# Patient Record
Sex: FEMALE | Race: WHITE | Employment: UNEMPLOYED | URBAN - METROPOLITAN AREA
[De-identification: names, ages, dates, MRNs, and addresses within clinical notes are randomized per-mention and may not be internally consistent; named-entity substitution may affect disease eponyms.]

---

## 2018-01-23 ENCOUNTER — HOSPITAL ENCOUNTER (OUTPATIENT)
Dept: MRI IMAGING | Age: 33
Discharge: HOME | End: 2018-01-23

## 2018-01-23 DIAGNOSIS — M54.16 LUMBAR RADICULOPATHY: ICD-10-CM

## 2018-09-25 LAB
ESTRADIOL LEVEL: 211 PG/ML
FOLLICLE STIMULATING HORMONE: 2.6 MIU/ML
TSH SERPL DL<=0.05 MIU/L-ACNC: 1.57 UIU/ML (ref 0.27–4.2)

## 2018-09-27 LAB
HPV COMMENT: NORMAL
HPV TYPE 16: NOT DETECTED
HPV TYPE 18: NOT DETECTED
HPVOH (OTHER TYPES): NOT DETECTED

## 2019-04-19 ENCOUNTER — HOSPITAL ENCOUNTER (EMERGENCY)
Age: 34
Discharge: HOME | End: 2019-04-19
Attending: EMERGENCY MEDICINE
Payer: COMMERCIAL

## 2019-04-19 ENCOUNTER — APPOINTMENT (OUTPATIENT)
Dept: CT IMAGING | Age: 34
End: 2019-04-19
Payer: COMMERCIAL

## 2019-04-19 VITALS
RESPIRATION RATE: 16 BRPM | SYSTOLIC BLOOD PRESSURE: 109 MMHG | HEIGHT: 63 IN | OXYGEN SATURATION: 98 % | HEART RATE: 103 BPM | TEMPERATURE: 98 F | WEIGHT: 145 LBS | DIASTOLIC BLOOD PRESSURE: 70 MMHG | BODY MASS INDEX: 25.69 KG/M2

## 2019-04-19 DIAGNOSIS — R10.84 GENERALIZED ABDOMINAL PAIN: Primary | ICD-10-CM

## 2019-04-19 DIAGNOSIS — R11.0 NAUSEA: ICD-10-CM

## 2019-04-19 LAB
A/G RATIO: 1.5 (ref 1.1–2.2)
ALBUMIN SERPL-MCNC: 4 G/DL (ref 3.4–5)
ALP BLD-CCNC: 79 U/L (ref 40–129)
ALT SERPL-CCNC: 17 U/L (ref 10–40)
AMYLASE: 36 U/L (ref 25–115)
ANION GAP SERPL CALCULATED.3IONS-SCNC: 7 MMOL/L (ref 3–16)
AST SERPL-CCNC: 17 U/L (ref 15–37)
BASOPHILS ABSOLUTE: 0 K/UL (ref 0–0.2)
BASOPHILS RELATIVE PERCENT: 0.6 %
BILIRUB SERPL-MCNC: <0.2 MG/DL (ref 0–1)
BILIRUBIN URINE: NEGATIVE
BLOOD, URINE: NEGATIVE
BUN BLDV-MCNC: 17 MG/DL (ref 7–20)
CALCIUM SERPL-MCNC: 8.8 MG/DL (ref 8.3–10.6)
CHLORIDE BLD-SCNC: 108 MMOL/L (ref 99–110)
CLARITY: ABNORMAL
CO2: 23 MMOL/L (ref 21–32)
COLOR: YELLOW
CREAT SERPL-MCNC: 0.6 MG/DL (ref 0.6–1.1)
CRYSTALS, UA: ABNORMAL /HPF
EOSINOPHILS ABSOLUTE: 0.2 K/UL (ref 0–0.6)
EOSINOPHILS RELATIVE PERCENT: 3.4 %
EPITHELIAL CELLS, UA: 3 /HPF (ref 0–5)
GFR AFRICAN AMERICAN: >60
GFR NON-AFRICAN AMERICAN: >60
GLOBULIN: 2.6 G/DL
GLUCOSE BLD-MCNC: 93 MG/DL (ref 70–99)
GLUCOSE URINE: NEGATIVE MG/DL
HCG(URINE) PREGNANCY TEST: NEGATIVE
HCT VFR BLD CALC: 39.9 % (ref 36–48)
HEMOGLOBIN: 13.1 G/DL (ref 12–16)
HYALINE CASTS: 1 /LPF (ref 0–8)
KETONES, URINE: NEGATIVE MG/DL
LEUKOCYTE ESTERASE, URINE: NEGATIVE
LIPASE: 12 U/L (ref 13–60)
LYMPHOCYTES ABSOLUTE: 1.9 K/UL (ref 1–5.1)
LYMPHOCYTES RELATIVE PERCENT: 34.6 %
MCH RBC QN AUTO: 30.7 PG (ref 26–34)
MCHC RBC AUTO-ENTMCNC: 32.7 G/DL (ref 31–36)
MCV RBC AUTO: 93.9 FL (ref 80–100)
MICROSCOPIC EXAMINATION: YES
MONOCYTES ABSOLUTE: 0.5 K/UL (ref 0–1.3)
MONOCYTES RELATIVE PERCENT: 9.9 %
NEUTROPHILS ABSOLUTE: 2.8 K/UL (ref 1.7–7.7)
NEUTROPHILS RELATIVE PERCENT: 51.5 %
NITRITE, URINE: NEGATIVE
PDW BLD-RTO: 13.7 % (ref 12.4–15.4)
PH UA: 6 (ref 5–8)
PLATELET # BLD: 233 K/UL (ref 135–450)
PMV BLD AUTO: 7.9 FL (ref 5–10.5)
POTASSIUM SERPL-SCNC: 4 MMOL/L (ref 3.5–5.1)
PROTEIN UA: NEGATIVE MG/DL
RBC # BLD: 4.25 M/UL (ref 4–5.2)
RBC UA: 1 /HPF (ref 0–4)
SODIUM BLD-SCNC: 138 MMOL/L (ref 136–145)
SPECIFIC GRAVITY UA: 1.03 (ref 1–1.03)
TOTAL PROTEIN: 6.6 G/DL (ref 6.4–8.2)
URINE TYPE: ABNORMAL
UROBILINOGEN, URINE: 1 E.U./DL
WBC # BLD: 5.4 K/UL (ref 4–11)
WBC UA: 1 /HPF (ref 0–5)

## 2019-04-19 PROCEDURE — 80053 COMPREHEN METABOLIC PANEL: CPT

## 2019-04-19 PROCEDURE — 84703 CHORIONIC GONADOTROPIN ASSAY: CPT

## 2019-04-19 PROCEDURE — 96374 THER/PROPH/DIAG INJ IV PUSH: CPT

## 2019-04-19 PROCEDURE — 6360000002 HC RX W HCPCS: Performed by: EMERGENCY MEDICINE

## 2019-04-19 PROCEDURE — 85025 COMPLETE CBC W/AUTO DIFF WBC: CPT

## 2019-04-19 PROCEDURE — 99284 EMERGENCY DEPT VISIT MOD MDM: CPT

## 2019-04-19 PROCEDURE — 96361 HYDRATE IV INFUSION ADD-ON: CPT

## 2019-04-19 PROCEDURE — 6360000004 HC RX CONTRAST MEDICATION: Performed by: EMERGENCY MEDICINE

## 2019-04-19 PROCEDURE — 2580000003 HC RX 258: Performed by: EMERGENCY MEDICINE

## 2019-04-19 PROCEDURE — 82150 ASSAY OF AMYLASE: CPT

## 2019-04-19 PROCEDURE — 74177 CT ABD & PELVIS W/CONTRAST: CPT

## 2019-04-19 PROCEDURE — 83690 ASSAY OF LIPASE: CPT

## 2019-04-19 PROCEDURE — 96372 THER/PROPH/DIAG INJ SC/IM: CPT

## 2019-04-19 PROCEDURE — 81001 URINALYSIS AUTO W/SCOPE: CPT

## 2019-04-19 RX ORDER — ALBUTEROL SULFATE 90 UG/1
2 AEROSOL, METERED RESPIRATORY (INHALATION) EVERY 4 HOURS PRN
COMMUNITY
Start: 2018-10-29

## 2019-04-19 RX ORDER — MORPHINE SULFATE 4 MG/ML
4 INJECTION, SOLUTION INTRAMUSCULAR; INTRAVENOUS
Status: DISCONTINUED | OUTPATIENT
Start: 2019-04-19 | End: 2019-04-20 | Stop reason: HOSPADM

## 2019-04-19 RX ORDER — RISPERIDONE 0.25 MG/1
TABLET, FILM COATED ORAL
Refills: 1 | Status: ON HOLD | COMMUNITY
Start: 2019-03-31 | End: 2021-10-17

## 2019-04-19 RX ORDER — 0.9 % SODIUM CHLORIDE 0.9 %
2000 INTRAVENOUS SOLUTION INTRAVENOUS ONCE
Status: COMPLETED | OUTPATIENT
Start: 2019-04-19 | End: 2019-04-19

## 2019-04-19 RX ORDER — PROMETHAZINE HYDROCHLORIDE 25 MG/ML
25 INJECTION, SOLUTION INTRAMUSCULAR; INTRAVENOUS ONCE
Status: COMPLETED | OUTPATIENT
Start: 2019-04-19 | End: 2019-04-19

## 2019-04-19 RX ORDER — PROMETHAZINE HYDROCHLORIDE 25 MG/1
25 TABLET ORAL EVERY 8 HOURS PRN
Qty: 20 TABLET | Refills: 0 | Status: SHIPPED | OUTPATIENT
Start: 2019-04-19 | End: 2019-04-26

## 2019-04-19 RX ADMIN — IOPAMIDOL 75 ML: 755 INJECTION, SOLUTION INTRAVENOUS at 21:28

## 2019-04-19 RX ADMIN — SODIUM CHLORIDE 2000 ML: 9 INJECTION, SOLUTION INTRAVENOUS at 21:39

## 2019-04-19 RX ADMIN — PROMETHAZINE HYDROCHLORIDE 25 MG: 25 INJECTION INTRAMUSCULAR; INTRAVENOUS at 21:39

## 2019-04-19 RX ADMIN — MORPHINE SULFATE 4 MG: 4 INJECTION INTRAVENOUS at 21:39

## 2019-04-19 ASSESSMENT — PAIN SCALES - GENERAL
PAINLEVEL_OUTOF10: 0
PAINLEVEL_OUTOF10: 7
PAINLEVEL_OUTOF10: 7

## 2019-04-19 ASSESSMENT — PAIN DESCRIPTION - LOCATION: LOCATION: ABDOMEN

## 2019-04-20 NOTE — ED NOTES
Discharge instructions given, patient acknowledged understanding, rx given x1, patient ambulated out of ed to waiting room to wait on ride, no wants or needs at this time      Tawnya Rodriguez RN  04/19/19 6198

## 2019-04-20 NOTE — ED PROVIDER NOTES
2007    WISDOM TOOTH EXTRACTION       Family History   Problem Relation Age of Onset    High Blood Pressure Mother     Cancer Father     Depression Father     Asthma Father      Social History     Socioeconomic History    Marital status: Single     Spouse name: Not on file    Number of children: Not on file    Years of education: Not on file    Highest education level: Not on file   Occupational History    Not on file   Social Needs    Financial resource strain: Not on file    Food insecurity:     Worry: Not on file     Inability: Not on file    Transportation needs:     Medical: Not on file     Non-medical: Not on file   Tobacco Use    Smoking status: Current Every Day Smoker     Packs/day: 0.50     Years: 15.00     Pack years: 7.50     Types: Cigarettes    Smokeless tobacco: Never Used    Tobacco comment: QUIT FOR 10 YEARS, RESTARTED 4-2014   Substance and Sexual Activity    Alcohol use: No    Drug use: No    Sexual activity: Yes     Partners: Male   Lifestyle    Physical activity:     Days per week: Not on file     Minutes per session: Not on file    Stress: Not on file   Relationships    Social connections:     Talks on phone: Not on file     Gets together: Not on file     Attends Denominational service: Not on file     Active member of club or organization: Not on file     Attends meetings of clubs or organizations: Not on file     Relationship status: Not on file    Intimate partner violence:     Fear of current or ex partner: Not on file     Emotionally abused: Not on file     Physically abused: Not on file     Forced sexual activity: Not on file   Other Topics Concern    Not on file   Social History Narrative    Not on file     Current Facility-Administered Medications   Medication Dose Route Frequency Provider Last Rate Last Dose    morphine injection 4 mg  4 mg Intravenous Q1H JACKIE Iglesias MD   4 mg at 04/19/19 3307     Current Outpatient Medications   Medication Sig 60 tablet 0    metoclopramide (REGLAN) 5 MG tablet   Take 5 mg by mouth 2 times daily       ondansetron (ZOFRAN-ODT) 4 MG disintegrating tablet Take 4 mg by mouth every 8 hours as needed for Nausea. No Known Allergies    REVIEW OF SYSTEMS  10 systems reviewed, pertinent positives per HPI otherwise noted to be negative. PHYSICAL EXAM  /70   Pulse 103   Temp 98 °F (36.7 °C) (Infrared)   Resp 16   Ht 5' 3\" (1.6 m)   Wt 145 lb (65.8 kg)   LMP 05/04/2014   SpO2 98%   BMI 25.69 kg/m²    GENERAL APPEARANCE: Awake and alert. Cooperative. Mild distress. HENT: Normocephalic. Atraumatic. Mucous membranes are dry  NECK: Supple. EYES: PERRL. EOM's grossly intact. HEART/CHEST: RRR. No murmurs. No chest wall tenderness. LUNGS: Respirations unlabored. CTAB. Good air exchange. Speaking comfortably in full sentences. ABDOMEN: Moderate diffuse nonfocal tenderness. Soft. Non-distended. No masses. No organomegaly. No guarding or rebound. Diminished bowel sounds throughout. MUSCULOSKELETAL: No extremity edema. Compartments soft. No deformity. No tenderness in the extremities. All extremities neurovascularly intact. SKIN: Warm and dry. No acute rashes. NEUROLOGICAL: Alert and oriented. CN's 2-12 intact. No gross facial drooping. Strength 5/5, sensation intact. 2 plus DTR's in knees bilaterally. Gait normal.  PSYCHIATRIC: Normal mood and affect. LABS  I have reviewed all labs for this visit.    Results for orders placed or performed during the hospital encounter of 04/19/19   Comprehensive Metabolic Panel   Result Value Ref Range    Sodium 138 136 - 145 mmol/L    Potassium 4.0 3.5 - 5.1 mmol/L    Chloride 108 99 - 110 mmol/L    CO2 23 21 - 32 mmol/L    Anion Gap 7 3 - 16    Glucose 93 70 - 99 mg/dL    BUN 17 7 - 20 mg/dL    CREATININE 0.6 0.6 - 1.1 mg/dL    GFR Non-African American >60 >60    GFR African American >60 >60    Calcium 8.8 8.3 - 10.6 mg/dL    Total Protein 6.6 6.4 - 8.2 g/dL    Alb 4.0 3.4 - 5.0 g/dL    Albumin/Globulin Ratio 1.5 1.1 - 2.2    Total Bilirubin <0.2 0.0 - 1.0 mg/dL    Alkaline Phosphatase 79 40 - 129 U/L    ALT 17 10 - 40 U/L    AST 17 15 - 37 U/L    Globulin 2.6 g/dL   CBC Auto Differential   Result Value Ref Range    WBC 5.4 4.0 - 11.0 K/uL    RBC 4.25 4.00 - 5.20 M/uL    Hemoglobin 13.1 12.0 - 16.0 g/dL    Hematocrit 39.9 36.0 - 48.0 %    MCV 93.9 80.0 - 100.0 fL    MCH 30.7 26.0 - 34.0 pg    MCHC 32.7 31.0 - 36.0 g/dL    RDW 13.7 12.4 - 15.4 %    Platelets 952 881 - 912 K/uL    MPV 7.9 5.0 - 10.5 fL    Neutrophils % 51.5 %    Lymphocytes % 34.6 %    Monocytes % 9.9 %    Eosinophils % 3.4 %    Basophils % 0.6 %    Neutrophils # 2.8 1.7 - 7.7 K/uL    Lymphocytes # 1.9 1.0 - 5.1 K/uL    Monocytes # 0.5 0.0 - 1.3 K/uL    Eosinophils # 0.2 0.0 - 0.6 K/uL    Basophils # 0.0 0.0 - 0.2 K/uL   Amylase   Result Value Ref Range    Amylase 36 25 - 115 U/L   Lipase   Result Value Ref Range    Lipase 12.0 (L) 13.0 - 60.0 U/L   Urinalysis   Result Value Ref Range    Color, UA YELLOW Straw/Yellow    Clarity, UA CLOUDY (A) Clear    Glucose, Ur Negative Negative mg/dL    Bilirubin Urine Negative Negative    Ketones, Urine Negative Negative mg/dL    Specific Gravity, UA 1.026 1.005 - 1.030    Blood, Urine Negative Negative    pH, UA 6.0 5.0 - 8.0    Protein, UA Negative Negative mg/dL    Urobilinogen, Urine 1.0 <2.0 E.U./dL    Nitrite, Urine Negative Negative    Leukocyte Esterase, Urine Negative Negative    Microscopic Examination YES     Urine Type Not Specified    Pregnancy, Urine   Result Value Ref Range    HCG(Urine) Pregnancy Test Negative Detects HCG level >20 MIU/mL   Microscopic Urinalysis   Result Value Ref Range    Crystals 2+ Ca.  Oxalate (A) /HPF    Hyaline Casts, UA 1 0 - 8 /LPF    WBC, UA 1 0 - 5 /HPF    RBC, UA 1 0 - 4 /HPF    Epi Cells 3 0 - 5 /HPF       RADIOLOGY    Ct Abdomen Pelvis W Iv Contrast Additional Contrast? None    Result Date: 4/19/2019  EXAMINATION: CT OF THE ABDOMEN AND PELVIS WITH CONTRAST 4/19/2019 9:31 pm TECHNIQUE: CT of the abdomen and pelvis was performed with the administration of intravenous contrast. Multiplanar reformatted images are provided for review. Dose modulation, iterative reconstruction, and/or weight based adjustment of the mA/kV was utilized to reduce the radiation dose to as low as reasonably achievable. COMPARISON: 01/19/2018 HISTORY: ORDERING SYSTEM PROVIDED HISTORY: diarrhea abd pain TECHNOLOGIST PROVIDED HISTORY: Additional Contrast?->None Ordering Physician Provided Reason for Exam: Abdominal Pain (Began Tuesday into wednesday with Nausea and diarrhea. Pt with belly pain also) Acuity: Acute Type of Exam: Initial Relevant Medical/Surgical History: prior gastric bypass, hyster FINDINGS: Lower Chest: Inferior lung bases are clear. Heart size is normal. Organs: Mild diffuse low-density of the liver is present. Gallbladder surgically absent. No worrisome hepatic lesion. Other abdominal organs appear normal. GI/Bowel: Suture lines on the stomach from prior bypass procedure. Large amount of stool in the colon. Normal appendix and terminal ileum. Stool noted in the terminal ileum. Pelvis: Uterus is surgically absent. Small amount of urine in the urinary bladder. No free fluid or adenopathy. Peritoneum/Retroperitoneum: No mass, adenopathy, or ascites. Normal aorta. No free air. Bones/Soft Tissues: No acute abnormality. Very large amount of stool throughout the colon without impaction. Back up of stool into the small bowel. Normal appendix. Mild fatty infiltration of the liver. Prior gastric bypass. No acute intra-abdominal abnormality. ED COURSE/MDM  Patient seen and evaluated. Old records reviewed. Labs and imaging reviewed and results discussed with patient.       After initial evaluation, differential diagnostic considerations included: kidney stone, pyelonephritis, UTI, appendicitis, bowel obstruction, diverticulitis, hernia, gastritis/gastroenteritis, pancreatitis, cholecystitis, hepatitis, constipation, IBS, IBD    The patient's ED workup was notable for clinically having diarrhea but based on imaging has concern for constipation. No acute surgical findings noted. No fever. Abd exam is benign. Labs wholly reassuring for metabolic derangement. No infections are noted. Given antiemetics, hydration encouraged, f/u with PCP. During the patient's ED course, the patient was given:  Medications   morphine injection 4 mg (4 mg Intravenous Given 4/19/19 2139)   0.9 % sodium chloride IV bolus 2,000 mL (0 mLs Intravenous Stopped 4/19/19 2248)   promethazine (PHENERGAN) injection 25 mg (25 mg Intramuscular Given 4/19/19 2139)   iopamidol (ISOVUE-370) 76 % injection 75 mL (75 mLs Intravenous Given 4/19/19 2128)        CLINICAL IMPRESSION  1. Generalized abdominal pain    2. Nausea        Blood pressure 109/70, pulse 103, temperature 98 °F (36.7 °C), temperature source Infrared, resp. rate 16, height 5' 3\" (1.6 m), weight 145 lb (65.8 kg), last menstrual period 05/04/2014, SpO2 98 %, not currently breastfeeding. Maria Guadalupe Hay was discharged to home in stable condition. I have discussed the findings of today's workup with the patient and addressed the patient's questions and concerns. Important warning signs as well as new or worsening symptoms which would necessitate immediate return to the ED were discussed. The plan is to discharge from the ED at this time, and the patient is in stable condition. The patient acknowledged understanding is agreeable with this plan. Patient was given scripts for the following medications. I counseled patient how to take these medications. New Prescriptions    PROMETHAZINE (PHENERGAN) 25 MG TABLET    Take 1 tablet by mouth every 8 hours as needed for Nausea (CAUTION: This medication can cause dizziness.   Do not drive or operate heavy machinery when on this medication.) Follow-up with:  Melecio Ciaransam [address] [place] [address]  [phone]    Schedule an appointment as soon as possible for a visit in 3 days  For symptom re-evaluation    [facility] Emergency Department  [address] [place] [address]  [phone]  Go to   If symptoms worsen      DISCLAIMER: This chart was created using Dragon dictation software. Efforts were made by me to ensure accuracy, however some errors may be present due to limitations of this technology and occasionally words are not transcribed correctly.         Karen Rivera MD  04/19/19 3373

## 2019-04-20 NOTE — ED NOTES
Patient resting with family at the bedside, states chest pain is gone      John Wilde RN  04/19/19 2049

## 2019-11-04 ENCOUNTER — HOSPITAL ENCOUNTER (EMERGENCY)
Age: 34
Discharge: HOME | End: 2019-11-04
Payer: COMMERCIAL

## 2019-11-04 VITALS
SYSTOLIC BLOOD PRESSURE: 113 MMHG | TEMPERATURE: 97.8 F | HEART RATE: 82 BPM | BODY MASS INDEX: 32.02 KG/M2 | RESPIRATION RATE: 17 BRPM | OXYGEN SATURATION: 100 % | WEIGHT: 180.78 LBS | DIASTOLIC BLOOD PRESSURE: 74 MMHG

## 2019-11-04 DIAGNOSIS — G89.29 ACUTE EXACERBATION OF CHRONIC LOW BACK PAIN: Primary | ICD-10-CM

## 2019-11-04 DIAGNOSIS — M54.50 ACUTE EXACERBATION OF CHRONIC LOW BACK PAIN: Primary | ICD-10-CM

## 2019-11-04 PROCEDURE — 2580000003 HC RX 258: Performed by: NURSE PRACTITIONER

## 2019-11-04 PROCEDURE — 99283 EMERGENCY DEPT VISIT LOW MDM: CPT

## 2019-11-04 PROCEDURE — 96365 THER/PROPH/DIAG IV INF INIT: CPT

## 2019-11-04 PROCEDURE — 6360000002 HC RX W HCPCS: Performed by: NURSE PRACTITIONER

## 2019-11-04 PROCEDURE — 6370000000 HC RX 637 (ALT 250 FOR IP): Performed by: NURSE PRACTITIONER

## 2019-11-04 RX ORDER — OXYCODONE HYDROCHLORIDE AND ACETAMINOPHEN 5; 325 MG/1; MG/1
1 TABLET ORAL ONCE
Status: COMPLETED | OUTPATIENT
Start: 2019-11-04 | End: 2019-11-04

## 2019-11-04 RX ORDER — PREDNISONE 20 MG/1
20 TABLET ORAL DAILY
Qty: 5 TABLET | Refills: 0 | Status: SHIPPED | OUTPATIENT
Start: 2019-11-04 | End: 2019-11-09

## 2019-11-04 RX ORDER — PREDNISONE 20 MG/1
60 TABLET ORAL ONCE
Status: COMPLETED | OUTPATIENT
Start: 2019-11-04 | End: 2019-11-04

## 2019-11-04 RX ADMIN — OXYCODONE HYDROCHLORIDE AND ACETAMINOPHEN 1 TABLET: 5; 325 TABLET ORAL at 15:41

## 2019-11-04 RX ADMIN — LIDOCAINE HYDROCHLORIDE 82 MG: 20 INJECTION INTRAVENOUS at 14:27

## 2019-11-04 RX ADMIN — PREDNISONE 60 MG: 20 TABLET ORAL at 15:41

## 2019-11-04 ASSESSMENT — ENCOUNTER SYMPTOMS
NAUSEA: 0
ABDOMINAL PAIN: 0
CONSTIPATION: 0
RESPIRATORY NEGATIVE: 1
BACK PAIN: 1

## 2019-11-04 ASSESSMENT — PAIN SCALES - GENERAL
PAINLEVEL_OUTOF10: 9
PAINLEVEL_OUTOF10: 5
PAINLEVEL_OUTOF10: 9
PAINLEVEL_OUTOF10: 5
PAINLEVEL_OUTOF10: 5

## 2019-11-04 ASSESSMENT — PAIN DESCRIPTION - DESCRIPTORS: DESCRIPTORS: ACHING

## 2019-11-04 ASSESSMENT — PAIN DESCRIPTION - ORIENTATION: ORIENTATION: LOWER

## 2019-11-04 ASSESSMENT — PAIN DESCRIPTION - FREQUENCY: FREQUENCY: INTERMITTENT

## 2019-11-04 ASSESSMENT — PAIN DESCRIPTION - PAIN TYPE: TYPE: CHRONIC PAIN

## 2019-11-04 ASSESSMENT — PAIN DESCRIPTION - LOCATION: LOCATION: BACK

## 2020-12-09 ENCOUNTER — OFFICE VISIT (OUTPATIENT)
Dept: ORTHOPEDIC SURGERY | Age: 35
End: 2020-12-09
Payer: COMMERCIAL

## 2020-12-09 VITALS — HEIGHT: 63 IN | BODY MASS INDEX: 40.08 KG/M2 | TEMPERATURE: 97.7 F | WEIGHT: 226.2 LBS

## 2020-12-09 PROCEDURE — G8417 CALC BMI ABV UP PARAM F/U: HCPCS | Performed by: PHYSICIAN ASSISTANT

## 2020-12-09 PROCEDURE — L1810 KO ELASTIC WITH JOINTS: HCPCS | Performed by: PHYSICIAN ASSISTANT

## 2020-12-09 PROCEDURE — G8427 DOCREV CUR MEDS BY ELIG CLIN: HCPCS | Performed by: PHYSICIAN ASSISTANT

## 2020-12-09 PROCEDURE — 4004F PT TOBACCO SCREEN RCVD TLK: CPT | Performed by: PHYSICIAN ASSISTANT

## 2020-12-09 PROCEDURE — G8484 FLU IMMUNIZE NO ADMIN: HCPCS | Performed by: PHYSICIAN ASSISTANT

## 2020-12-09 PROCEDURE — 99203 OFFICE O/P NEW LOW 30 MIN: CPT | Performed by: PHYSICIAN ASSISTANT

## 2020-12-09 RX ORDER — ETODOLAC 500 MG/1
500 TABLET, FILM COATED ORAL 2 TIMES DAILY
COMMUNITY

## 2020-12-09 RX ORDER — VENLAFAXINE HYDROCHLORIDE 37.5 MG/1
37.5 CAPSULE, EXTENDED RELEASE ORAL DAILY
Status: ON HOLD | COMMUNITY
End: 2021-10-17

## 2020-12-09 RX ORDER — ARIPIPRAZOLE 5 MG/1
5 TABLET ORAL DAILY
COMMUNITY

## 2020-12-10 ENCOUNTER — TELEPHONE (OUTPATIENT)
Dept: ORTHOPEDIC SURGERY | Age: 35
End: 2020-12-10

## 2020-12-10 NOTE — TELEPHONE ENCOUNTER
LVM for patient that MRI is authorized for [employer] and order and Yeyo Paulson is faxed. Gave number to call and schedule.

## 2020-12-10 NOTE — PROGRESS NOTES
MG extended release capsule Take 37.5 mg by mouth daily      etodolac (LODINE) 500 MG tablet Take 500 mg by mouth 2 times daily      hyoscyamine (LEVSIN/SL) 125 MCG sublingual tablet hyoscyamine 0.125 mg sublingual tablet    1 tablet as needed by sublingual route.  albuterol sulfate  (90 Base) MCG/ACT inhaler Inhale 2 puffs into the lungs      risperiDONE (RISPERDAL) 0.25 MG tablet qid  1    azithromycin (ZITHROMAX) 250 MG tablet Take 2 tablets on day 1. Take one tablet on days 2 through 4. 6 tablet 0    sennosides-docusate sodium (SENOKOT-S) 8.6-50 MG tablet Take 1 tablet by mouth 2 times daily 60 tablet 0    ibuprofen (ADVIL;MOTRIN) 600 MG tablet Take 1 tablet by mouth every 6 hours as needed for Pain 120 tablet 3    ondansetron (ZOFRAN ODT) 4 MG disintegrating tablet Take 1 tablet by mouth every 8 hours as needed for Nausea or Vomiting 25 tablet 1    diphenoxylate-atropine (LOMOTIL) 2.5-0.025 MG per tablet Take 1 tablet by mouth 3 times daily as needed for Diarrhea      simethicone (MYLICON) 80 MG chewable tablet Take 160 mg by mouth 2 times daily      oxyCODONE 5 MG capsule Take 5 mg by mouth every 12 hours as needed for Pain      lisinopril (PRINIVIL;ZESTRIL) 10 MG tablet Take 10 mg by mouth daily At bedtime      potassium chloride (KLOR-CON) 20 MEQ packet Take 10 mEq by mouth daily       busPIRone (BUSPAR) 15 MG tablet Take 30 mg by mouth 2 times daily       Cholecalciferol (VITAMIN D3) 2000 UNITS CAPS Take  by mouth daily.  metoclopramide (REGLAN) 5 MG tablet   Take 5 mg by mouth 2 times daily       Multiple Vitamins-Minerals (THERAPEUTIC MULTIVITAMIN-MINERALS) tablet Take 1 tablet by mouth daily.  Cyanocobalamin (VITAMIN B-12) 5000 MCG SUBL Place under the tongue daily       cyclobenzaprine (FLEXERIL) 10 MG tablet Take 10 mg by mouth 2 times daily as needed       ondansetron (ZOFRAN-ODT) 4 MG disintegrating tablet Take 4 mg by mouth every 8 hours as needed for Nausea.  ferrous sulfate 325 (65 FE) MG tablet Take 325 mg by mouth daily (with breakfast). No current facility-administered medications on file prior to visit. Objective:   Temperature 97.7 °F (36.5 °C), height 5' 3\" (1.6 m), weight 226 lb 3.2 oz (102.6 kg), last menstrual period 05/04/2014, not currently breastfeeding. On examination this is a pleasant 18-year-old female in moderate distress she is alert and oriented x3 she has good distal pulses good dorsiflexion plantarflexion strength no significant varus or valgus laxity of the knee but she bluntly go past 20 degrees short of full extension and she will only bend about 60 to 70 degrees of flexion stating that there is pain in the medial joint line. There is some swelling and she has some mild sensitivity with patellar compression testing. I feel like there is a palpable click or pop while going from flexion and extension the medial compartment and I am concerned about a mechanical medial meniscus tear. Because of her lack of range of motion I cannot confirm or deny a ligament tear. She has good symmetric motion to the hips with a negative straight leg raise at 40 degrees  Neuro exam grossly intact both lower extremities. Intact sensation to light touch. Motor exam 4+ to 5/5 in all major motor groups. Negative Esparza's sign. Skin is warm, dry and intact with out erythema or significant increased temperature around the knee joint(s). There are no cutaneous lesions or lymphadenopathy present. X-RAYS:  X-rays taken the office today show very minimal early signs of osteoarthritis in the medial compartment with joint space narrowing and subchondral sclerosis.   Patella tracking is appropriate there is no other abnormalities noted and this was shown to the patient      Assessment:  Left knee internal derangement possible medial meniscus tear    Plan:  During today's visit, there was approximately 30 minutes of face-to-face discussion in regards to the patient's current condition and treatment options. More than 50 % of the time was counseling and coordination of care as indicated above. We talked about examination possible surgery but at this point I think it is warranted to get an MRI to look at the integrity of the medial meniscus to see if it truly is a mechanical problem or if this is a hypertrophic synovitis.       PROCEDURE NOTE:   Order stat MRI      They will schedule a follow up in 1 to 2 days with Dr. Tra Nichols

## 2020-12-11 ENCOUNTER — TELEPHONE (OUTPATIENT)
Dept: ORTHOPEDIC SURGERY | Age: 35
End: 2020-12-11

## 2021-01-04 ENCOUNTER — OFFICE VISIT (OUTPATIENT)
Dept: ORTHOPEDIC SURGERY | Age: 36
End: 2021-01-04
Payer: COMMERCIAL

## 2021-01-04 VITALS — HEIGHT: 63 IN | BODY MASS INDEX: 40.04 KG/M2 | TEMPERATURE: 97.3 F | WEIGHT: 226 LBS

## 2021-01-04 DIAGNOSIS — M95.8 OSTEOCHONDRAL DEFECT: Primary | ICD-10-CM

## 2021-01-04 PROCEDURE — G8484 FLU IMMUNIZE NO ADMIN: HCPCS | Performed by: PHYSICIAN ASSISTANT

## 2021-01-04 PROCEDURE — 4004F PT TOBACCO SCREEN RCVD TLK: CPT | Performed by: PHYSICIAN ASSISTANT

## 2021-01-04 PROCEDURE — G8427 DOCREV CUR MEDS BY ELIG CLIN: HCPCS | Performed by: PHYSICIAN ASSISTANT

## 2021-01-04 PROCEDURE — 99213 OFFICE O/P EST LOW 20 MIN: CPT | Performed by: PHYSICIAN ASSISTANT

## 2021-01-04 PROCEDURE — G8417 CALC BMI ABV UP PARAM F/U: HCPCS | Performed by: PHYSICIAN ASSISTANT

## 2021-01-06 NOTE — PROGRESS NOTES
This dictation was done with Dragon dictation and may contain mechanical errors related to translation. I have today reviewed with Pk Krueger the clinically relevant, past medical history, medications, allergies, family history, social history, and Review Of Systems form the patients most recent history form & I have documented any details relevant to today's presenting complaints in my history below. Ms. Lila Morley's self-reported past medical history, medications, allergies, family history, social history, and Review Of Systems form has been scanned into the chart under the \"Media\" tab. Subjective:  Pk Krueger is a 38 y.o. who is here in follow-up after MRI was completed of her left knee. Left knee has been terribly painful and feels like there is something stuck moving around. Apparently there was an osteochondral defect that occurred over the medial femoral condyle roughly the size of a dime that is creating a lot of pain and soreness and the lack of protection is also causing swelling and soreness. Her exam is consistent with that.   I discussed this with Dr. Jame Balderas and he suggested to have her do a consultation with Dr. Castellon Whittier for possible surgical intervention      Patient Active Problem List   Diagnosis    Heavy menstrual bleeding    Cholelithiasis    Bariatric surgery status    Gastric bypass status for obesity    Symptomatic cholelithiasis    Chronic tonsillitis    Depression    Esophageal reflux    LAUREN (generalized anxiety disorder)    Hypertrophy of tonsils alone    Morbid obesity ([address]           Current Outpatient Medications on File Prior to Visit   Medication Sig Dispense Refill    ARIPiprazole (ABILIFY) 5 MG tablet Take 5 mg by mouth daily      venlafaxine (EFFEXOR XR) 37.5 MG extended release capsule Take 37.5 mg by mouth daily      etodolac (LODINE) 500 MG tablet Take 500 mg by mouth 2 times daily  hyoscyamine (LEVSIN/SL) 125 MCG sublingual tablet hyoscyamine 0.125 mg sublingual tablet    1 tablet as needed by sublingual route.  albuterol sulfate  (90 Base) MCG/ACT inhaler Inhale 2 puffs into the lungs      risperiDONE (RISPERDAL) 0.25 MG tablet qid  1    sennosides-docusate sodium (SENOKOT-S) 8.6-50 MG tablet Take 1 tablet by mouth 2 times daily 60 tablet 0    ibuprofen (ADVIL;MOTRIN) 600 MG tablet Take 1 tablet by mouth every 6 hours as needed for Pain 120 tablet 3    ondansetron (ZOFRAN ODT) 4 MG disintegrating tablet Take 1 tablet by mouth every 8 hours as needed for Nausea or Vomiting 25 tablet 1    diphenoxylate-atropine (LOMOTIL) 2.5-0.025 MG per tablet Take 1 tablet by mouth 3 times daily as needed for Diarrhea      simethicone (MYLICON) 80 MG chewable tablet Take 160 mg by mouth 2 times daily      oxyCODONE 5 MG capsule Take 5 mg by mouth every 12 hours as needed for Pain      lisinopril (PRINIVIL;ZESTRIL) 10 MG tablet Take 10 mg by mouth daily At bedtime      potassium chloride (KLOR-CON) 20 MEQ packet Take 10 mEq by mouth daily       busPIRone (BUSPAR) 15 MG tablet Take 30 mg by mouth 2 times daily       Cholecalciferol (VITAMIN D3) 2000 UNITS CAPS Take  by mouth daily.  metoclopramide (REGLAN) 5 MG tablet   Take 5 mg by mouth 2 times daily       Multiple Vitamins-Minerals (THERAPEUTIC MULTIVITAMIN-MINERALS) tablet Take 1 tablet by mouth daily.  Cyanocobalamin (VITAMIN B-12) 5000 MCG SUBL Place under the tongue daily       cyclobenzaprine (FLEXERIL) 10 MG tablet Take 10 mg by mouth 2 times daily as needed       ondansetron (ZOFRAN-ODT) 4 MG disintegrating tablet Take 4 mg by mouth every 8 hours as needed for Nausea.  ferrous sulfate 325 (65 FE) MG tablet Take 325 mg by mouth daily (with breakfast). No current facility-administered medications on file prior to visit.           Objective: Temperature 97.3 °F (36.3 °C), temperature source Infrared, height 5' 3\" (1.6 m), weight 226 lb (102.5 kg), last menstrual period 05/04/2014, not currently breastfeeding. On examination she has swelling soreness and clicking and popping in the medial side of her knee with pain with flexion and extension. Her quad tone is down she is neurologically intact to the right foot with good dorsiflexion plantarflexion strength in the left foot with good dorsiflexion and plantarflexion strength. Neuro exam grossly intact both lower extremities. Intact sensation to light touch. Motor exam 4+ to 5/5 in all major motor groups. Negative Esparza's sign. Skin is warm, dry and intact with out erythema or significant increased temperature around the knee joint(s). There are no cutaneous lesions or lymphadenopathy present. X-RAYS:  MRI results were discussed and the films were reviewed together      Assessment:  Left knee medial femoral condyle osteochondral defect    Plan:  During today's visit, there was approximately 20 minutes of face-to-face discussion in regards to the patient's current condition and treatment options. More than 50 % of the time was counseling and coordination of care as indicated above.   At this point this is causing a lot of pain and disability and we will look to schedule his surgical consultation      PROCEDURE NOTE:   MRI reviewed      They will schedule a follow up in referred to Dr. Gil Lee

## 2021-01-12 ENCOUNTER — OFFICE VISIT (OUTPATIENT)
Dept: ORTHOPEDIC SURGERY | Age: 36
End: 2021-01-12
Payer: COMMERCIAL

## 2021-01-12 VITALS — HEIGHT: 63 IN | BODY MASS INDEX: 40.04 KG/M2 | TEMPERATURE: 97.3 F | WEIGHT: 226 LBS

## 2021-01-12 DIAGNOSIS — M95.8 OSTEOCHONDRAL DEFECT OF FEMORAL CONDYLE: Primary | ICD-10-CM

## 2021-01-12 DIAGNOSIS — M25.562 LEFT KNEE PAIN, UNSPECIFIED CHRONICITY: ICD-10-CM

## 2021-01-12 PROCEDURE — 99213 OFFICE O/P EST LOW 20 MIN: CPT | Performed by: ORTHOPAEDIC SURGERY

## 2021-01-12 PROCEDURE — G8417 CALC BMI ABV UP PARAM F/U: HCPCS | Performed by: ORTHOPAEDIC SURGERY

## 2021-01-12 PROCEDURE — G8427 DOCREV CUR MEDS BY ELIG CLIN: HCPCS | Performed by: ORTHOPAEDIC SURGERY

## 2021-01-12 PROCEDURE — G8484 FLU IMMUNIZE NO ADMIN: HCPCS | Performed by: ORTHOPAEDIC SURGERY

## 2021-01-12 NOTE — PROGRESS NOTES
CHIEF COMPLAINT: Left knee pain. History:   Kvng Champion is a 38 y.o. female referred by ALIA James for Sport Medicine consultation for evaluation and treatment of left knee pain / injury. The patient complains of left knee pain. This is evaluated as a personal injury. There was not a history of injury. She tells me she today that she just woke up with pain. The pain began 1 month ago. The pain is located anterior and posterior. She describes the symptoms as aching, sharp, shooting, stabbing and throbbing. She rates pain at 7/10. Symptoms improve with nothing. The symptoms are worse with everything. The patient cannot bend and straighten the knee fully. There is swelling in the knee. The patient has not had PT. The patient has not had an injection. The patient has taken NSAIDs. The patient has tried ice. She is wearing a knee brace, which she states helps her walk. 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 She is on chronic Oxycodone. Outside reports reviewed: KELLIE MARIN [facility] office note.     Past Medical History:   Diagnosis Date    Anxiety     Chronic back pain     Chronic bronchitis (HCC)     not medically treated    Degenerative disc disease     Gastric bypass status for obesity 10/28/2014    Grief     LOST HER FIANCE     Hypertension     pt states no longer has since weight loss    IBS (irritable bowel syndrome)     MDRO (multiple drug resistant organisms) resistance     MRSA (methicillin resistant Staphylococcus aureus) infection     right side of abdomen    Nausea & vomiting     \"Horrible post-op: n/v/ HA    Prolonged emergence from general anesthesia        Past Surgical History:   Procedure Laterality Date     SECTION      CHOLECYSTECTOMY      [address] OF UTERUS  2010     hysteroscopy, with novasure ablation    GASTRIC BYPASS SURGERY  3-2014    HYSTERECTOMY  14    ROBOTIC SUPRACERVICAL HYSTERECTOMY WITH BILATERAL SALPINGECTOMY AND REMOVAL OF PERINEUM CONDYLOMA    TOENAIL EXCISION Right 07/28/2016    Winograd partial nail matrixectomy of the right great toe medial nail fold    TUBAL LIGATION  2007    WISDOM TOOTH EXTRACTION         Family History   Problem Relation Age of Onset    High Blood Pressure Mother     Cancer Father     Depression Father     Asthma Father        Social History     Socioeconomic History    Marital status: Single     Spouse name: None    Number of children: None    Years of education: None    Highest education level: None   Occupational History    None   Social Needs    Financial resource strain: None    Food insecurity     Worry: None     Inability: None    Transportation needs     Medical: None     Non-medical: None   Tobacco Use    Smoking status: Current Every Day Smoker     Packs/day: 1.50     Years: 15.00     Pack years: 22.50     Types: Cigarettes    Smokeless tobacco: Never Used    Tobacco comment: QUIT FOR 10 YEARS, RESTARTED 4-2014   Substance and Sexual Activity    Alcohol use: No    Drug use: No    Sexual activity: Yes     Partners: Male   Lifestyle    Physical activity     Days per week: None     Minutes per session: None    Stress: None   Relationships    Social connections     Talks on phone: None     Gets together: None     Attends Hindu service: None     Active member of club or organization: None     Attends meetings of clubs or organizations: None     Relationship status: None    Intimate partner violence     Fear of current or ex partner: None     Emotionally abused: None     Physically abused: None     Forced sexual activity: None   Other Topics Concern    None   Social History Narrative    None       Current Outpatient Medications   Medication Sig Dispense Refill    ARIPiprazole (ABILIFY) 5 MG tablet Take 5 mg by mouth daily      venlafaxine (EFFEXOR XR) 37.5 MG extended release capsule Take 37.5 mg by mouth daily      etodolac (LODINE) 500 MG tablet Take 500 mg by mouth 2 times daily      hyoscyamine (LEVSIN/SL) 125 MCG sublingual tablet hyoscyamine 0.125 mg sublingual tablet    1 tablet as needed by sublingual route.  albuterol sulfate  (90 Base) MCG/ACT inhaler Inhale 2 puffs into the lungs      risperiDONE (RISPERDAL) 0.25 MG tablet qid  1    sennosides-docusate sodium (SENOKOT-S) 8.6-50 MG tablet Take 1 tablet by mouth 2 times daily 60 tablet 0    ibuprofen (ADVIL;MOTRIN) 600 MG tablet Take 1 tablet by mouth every 6 hours as needed for Pain 120 tablet 3    ondansetron (ZOFRAN ODT) 4 MG disintegrating tablet Take 1 tablet by mouth every 8 hours as needed for Nausea or Vomiting 25 tablet 1    diphenoxylate-atropine (LOMOTIL) 2.5-0.025 MG per tablet Take 1 tablet by mouth 3 times daily as needed for Diarrhea      simethicone (MYLICON) 80 MG chewable tablet Take 160 mg by mouth 2 times daily      oxyCODONE 5 MG capsule Take 5 mg by mouth every 12 hours as needed for Pain      lisinopril (PRINIVIL;ZESTRIL) 10 MG tablet Take 10 mg by mouth daily At bedtime      potassium chloride (KLOR-CON) 20 MEQ packet Take 10 mEq by mouth daily       busPIRone (BUSPAR) 15 MG tablet Take 30 mg by mouth 2 times daily       Cholecalciferol (VITAMIN D3) 2000 UNITS CAPS Take  by mouth daily.  metoclopramide (REGLAN) 5 MG tablet   Take 5 mg by mouth 2 times daily       Multiple Vitamins-Minerals (THERAPEUTIC MULTIVITAMIN-MINERALS) tablet Take 1 tablet by mouth daily.  Cyanocobalamin (VITAMIN B-12) 5000 MCG SUBL Place under the tongue daily       cyclobenzaprine (FLEXERIL) 10 MG tablet Take 10 mg by mouth 2 times daily as needed       ondansetron (ZOFRAN-ODT) 4 MG disintegrating tablet Take 4 mg by mouth every 8 hours as needed for Nausea.  ferrous sulfate 325 (65 FE) MG tablet Take 325 mg by mouth daily (with breakfast). No current facility-administered medications for this visit.         No Known Allergies    Review of Systems:  I have reviewed the clinically relevant past medical history, medications, allergies, family history, social history, and 13 point Review of Systems from the patient's recent history form & documented any details relevant to today's presenting complaints in the history above. The patient's self-reported past medical history, medications, allergies, family history, social history, and Review of Systems form from 12/9/20 have been scanned into the chart under the \"Media\" tab. No interval changes. Physical Examination:     Vital signs:   Temp 97.3 °F (36.3 °C)   Ht 5' 3\" (1.6 m)   Wt 226 lb (102.5 kg)   LMP 05/04/2014   BMI 40.03 kg/m²     General:  alert, appears stated age, cooperative and no distress   Gait:  Normal. The patient can bear weight on the injured extremity. Left Knee  Alignment:  neutral   ROM:  0 degrees extension to 0 degrees flexion. She starts crying in pain when I just touch her knee. Right knee: 0 degrees extension, 120 flexion   Crepitus:  unable to test   Joint Tenderness:  globally   Effusion:   30-40 cc   Patellar excursion:  unable to test secondary to pain   Patellar tilt test:  unable to test secondary to pain   Lachman test:  unable to test secondary to pain   Right knee: negative   Anterior drawer test:  unable to test secondary to pain   Right knee: negative   Posterior drawer:   unable to test secondary to pain    Right knee: negative   Varus laxity at 30 degrees:  unable to test secondary to pain   Right knee: negative   Valgus laxity at 30 degrees:   unable to test secondary to pain   Right knee: negative   Skye's test:  unable to test secondary to pain   Right knee: negative   Tona's test:  unable to test secondary to pain   Right knee: unable to test secondary to pain     There is not any cellulitis, lymphedema or cutaneous lesions noted in the lower extremities.  Motor exam of the lower extremities show quadriceps, hamstrings, foot dorsiflexion and plantarflexion grossly intact. Sensation to both feet is grossly intact to light touch. The bilateral lower extremities are warm and well-perfused with brisk capillary refill. Imaging:  Left Knee X-Ray: Bilateral sunrise and standing PA xrays of the knee were obtained and reviewed. Lateral view from outside reviewed. I also obtained leg-length xray of left knee to evaluate alignment and a hyperflexed lateral view to see how anterior the OCD could be brought up anteriorly. No fracture, dislocation, lytic lesion. Mild medial narrowing. Normal alignment. Left Knee MRI Proscan 12/11/20: I independently reviewed the images, as well as the radiology report. 1. Fluid-filled osteochondral defect weightbearing and lateral aspect of the medial femoral    condyle typical of osteochondritis dissecans.  Defect measures 1.35 x 1.25 x 0.5cm.  Dislodged    body or fragmented bodies noted posterior to the mid PCL measuring 1.4 x 1 x 0.3cm in    aggregate. 2. MCL and LCL thickening and inflammation.  Sprain and capsulitis present. 3. Medial and lateral meniscus degeneration.  No tear. 4. No cruciate tear. Assessment:     Left knee medial femoral condyle osteochondral defect. Morbid obesity  Chronic pain syndrome / chronic opioid use  GERD  Anxiety      Plan:     Natural history and expected course discussed. Questions answered. Discussed the MRI finding of osteochondral defect. Discussed treatment options. Discussed that osteochondral allograft transplant might be her best option. I am unsure if the defect is anterior enough to be able to have a perpendicular approach for drilling and placing a graft. I am going to refer her to Dr. Rain Giron for surgical evaluation, given his extensive experience. I discussed with the patient that we need to get her knee moving. If she does not move it, it will get stiff. If she has no range of motion or some stiffness, the risk of postop stiffness will be even greater. I wanted to provide her with PT referral, however she refused.

## 2021-10-16 ENCOUNTER — APPOINTMENT (OUTPATIENT)
Dept: GENERAL RADIOLOGY | Age: 36
End: 2021-10-16
Payer: COMMERCIAL

## 2021-10-16 ENCOUNTER — HOSPITAL ENCOUNTER (OUTPATIENT)
Age: 36
Setting detail: OBSERVATION
Discharge: HOME | End: 2021-10-17
Attending: EMERGENCY MEDICINE | Admitting: STUDENT IN AN ORGANIZED HEALTH CARE EDUCATION/TRAINING PROGRAM
Payer: COMMERCIAL

## 2021-10-16 ENCOUNTER — APPOINTMENT (OUTPATIENT)
Dept: CT IMAGING | Age: 36
End: 2021-10-16
Payer: COMMERCIAL

## 2021-10-16 DIAGNOSIS — U07.1 COVID-19: ICD-10-CM

## 2021-10-16 DIAGNOSIS — R09.02 HYPOXIA: Primary | ICD-10-CM

## 2021-10-16 LAB
ANION GAP SERPL CALCULATED.3IONS-SCNC: 11 MMOL/L (ref 3–16)
BASOPHILS ABSOLUTE: 0.1 K/UL (ref 0–0.2)
BASOPHILS RELATIVE PERCENT: 1.3 %
BUN BLDV-MCNC: 13 MG/DL (ref 7–20)
CALCIUM SERPL-MCNC: 9.1 MG/DL (ref 8.3–10.6)
CHLORIDE BLD-SCNC: 103 MMOL/L (ref 99–110)
CO2: 23 MMOL/L (ref 21–32)
CREAT SERPL-MCNC: 0.6 MG/DL (ref 0.6–1.1)
EOSINOPHILS ABSOLUTE: 0.3 K/UL (ref 0–0.6)
EOSINOPHILS RELATIVE PERCENT: 4 %
GFR AFRICAN AMERICAN: >60
GFR NON-AFRICAN AMERICAN: >60
GLUCOSE BLD-MCNC: 130 MG/DL (ref 70–99)
HCG QUALITATIVE: NEGATIVE
HCT VFR BLD CALC: 37.9 % (ref 36–48)
HEMOGLOBIN: 12.3 G/DL (ref 12–16)
LYMPHOCYTES ABSOLUTE: 1.9 K/UL (ref 1–5.1)
LYMPHOCYTES RELATIVE PERCENT: 27.3 %
MCH RBC QN AUTO: 29.2 PG (ref 26–34)
MCHC RBC AUTO-ENTMCNC: 32.4 G/DL (ref 31–36)
MCV RBC AUTO: 89.9 FL (ref 80–100)
MONOCYTES ABSOLUTE: 0.5 K/UL (ref 0–1.3)
MONOCYTES RELATIVE PERCENT: 6.8 %
NEUTROPHILS ABSOLUTE: 4.2 K/UL (ref 1.7–7.7)
NEUTROPHILS RELATIVE PERCENT: 60.6 %
PDW BLD-RTO: 14.2 % (ref 12.4–15.4)
PLATELET # BLD: 257 K/UL (ref 135–450)
PMV BLD AUTO: 8.1 FL (ref 5–10.5)
POTASSIUM REFLEX MAGNESIUM: 4.1 MMOL/L (ref 3.5–5.1)
RBC # BLD: 4.21 M/UL (ref 4–5.2)
SODIUM BLD-SCNC: 137 MMOL/L (ref 136–145)
WBC # BLD: 6.9 K/UL (ref 4–11)

## 2021-10-16 PROCEDURE — 80048 BASIC METABOLIC PNL TOTAL CA: CPT

## 2021-10-16 PROCEDURE — 93005 ELECTROCARDIOGRAM TRACING: CPT | Performed by: NURSE PRACTITIONER

## 2021-10-16 PROCEDURE — 6360000002 HC RX W HCPCS: Performed by: NURSE PRACTITIONER

## 2021-10-16 PROCEDURE — 99284 EMERGENCY DEPT VISIT MOD MDM: CPT

## 2021-10-16 PROCEDURE — 96374 THER/PROPH/DIAG INJ IV PUSH: CPT

## 2021-10-16 PROCEDURE — 6370000000 HC RX 637 (ALT 250 FOR IP): Performed by: NURSE PRACTITIONER

## 2021-10-16 PROCEDURE — 84703 CHORIONIC GONADOTROPIN ASSAY: CPT

## 2021-10-16 PROCEDURE — 71260 CT THORAX DX C+: CPT

## 2021-10-16 PROCEDURE — 2580000003 HC RX 258: Performed by: NURSE PRACTITIONER

## 2021-10-16 PROCEDURE — 6360000004 HC RX CONTRAST MEDICATION: Performed by: NURSE PRACTITIONER

## 2021-10-16 PROCEDURE — 85025 COMPLETE CBC W/AUTO DIFF WBC: CPT

## 2021-10-16 PROCEDURE — 71045 X-RAY EXAM CHEST 1 VIEW: CPT

## 2021-10-16 PROCEDURE — 36415 COLL VENOUS BLD VENIPUNCTURE: CPT

## 2021-10-16 RX ORDER — ONDANSETRON 2 MG/ML
4 INJECTION INTRAMUSCULAR; INTRAVENOUS ONCE
Status: COMPLETED | OUTPATIENT
Start: 2021-10-16 | End: 2021-10-16

## 2021-10-16 RX ORDER — ACETAMINOPHEN 500 MG
1000 TABLET ORAL ONCE
Status: COMPLETED | OUTPATIENT
Start: 2021-10-16 | End: 2021-10-16

## 2021-10-16 RX ORDER — 0.9 % SODIUM CHLORIDE 0.9 %
1000 INTRAVENOUS SOLUTION INTRAVENOUS ONCE
Status: COMPLETED | OUTPATIENT
Start: 2021-10-16 | End: 2021-10-16

## 2021-10-16 RX ORDER — DEXAMETHASONE SODIUM PHOSPHATE 4 MG/ML
10 INJECTION, SOLUTION INTRA-ARTICULAR; INTRALESIONAL; INTRAMUSCULAR; INTRAVENOUS; SOFT TISSUE ONCE
Status: COMPLETED | OUTPATIENT
Start: 2021-10-16 | End: 2021-10-17

## 2021-10-16 RX ADMIN — ACETAMINOPHEN 1000 MG: 500 TABLET ORAL at 22:44

## 2021-10-16 RX ADMIN — ONDANSETRON 4 MG: 2 INJECTION INTRAMUSCULAR; INTRAVENOUS at 22:00

## 2021-10-16 RX ADMIN — IOPAMIDOL 75 ML: 755 INJECTION, SOLUTION INTRAVENOUS at 21:01

## 2021-10-16 RX ADMIN — SODIUM CHLORIDE 1000 ML: 9 INJECTION, SOLUTION INTRAVENOUS at 21:43

## 2021-10-16 ASSESSMENT — PAIN DESCRIPTION - ONSET: ONSET: ON-GOING

## 2021-10-16 ASSESSMENT — PAIN SCALES - GENERAL
PAINLEVEL_OUTOF10: 8
PAINLEVEL_OUTOF10: 8

## 2021-10-16 ASSESSMENT — PAIN DESCRIPTION - LOCATION: LOCATION: GENERALIZED

## 2021-10-16 ASSESSMENT — PAIN DESCRIPTION - DESCRIPTORS: DESCRIPTORS: ACHING

## 2021-10-16 ASSESSMENT — PAIN DESCRIPTION - PAIN TYPE: TYPE: ACUTE PAIN

## 2021-10-16 ASSESSMENT — PAIN DESCRIPTION - FREQUENCY: FREQUENCY: CONTINUOUS

## 2021-10-17 VITALS
HEIGHT: 63 IN | OXYGEN SATURATION: 96 % | DIASTOLIC BLOOD PRESSURE: 80 MMHG | RESPIRATION RATE: 18 BRPM | TEMPERATURE: 97.5 F | WEIGHT: 271.39 LBS | HEART RATE: 103 BPM | SYSTOLIC BLOOD PRESSURE: 136 MMHG | BODY MASS INDEX: 48.09 KG/M2

## 2021-10-17 PROBLEM — J12.82 PNEUMONIA DUE TO COVID-19 VIRUS: Status: ACTIVE | Noted: 2021-10-17

## 2021-10-17 PROBLEM — U07.1 PNEUMONIA DUE TO COVID-19 VIRUS: Status: ACTIVE | Noted: 2021-10-17

## 2021-10-17 LAB
A/G RATIO: 1.3 (ref 1.1–2.2)
ALBUMIN SERPL-MCNC: 3.6 G/DL (ref 3.4–5)
ALP BLD-CCNC: 111 U/L (ref 40–129)
ALT SERPL-CCNC: 16 U/L (ref 10–40)
ANION GAP SERPL CALCULATED.3IONS-SCNC: 11 MMOL/L (ref 3–16)
AST SERPL-CCNC: 15 U/L (ref 15–37)
BASOPHILS ABSOLUTE: 0 K/UL (ref 0–0.2)
BASOPHILS RELATIVE PERCENT: 0.3 %
BILIRUB SERPL-MCNC: <0.2 MG/DL (ref 0–1)
BUN BLDV-MCNC: 11 MG/DL (ref 7–20)
C-REACTIVE PROTEIN: 5.5 MG/L (ref 0–5.1)
CALCIUM SERPL-MCNC: 8.9 MG/DL (ref 8.3–10.6)
CHLORIDE BLD-SCNC: 106 MMOL/L (ref 99–110)
CO2: 20 MMOL/L (ref 21–32)
CREAT SERPL-MCNC: 0.6 MG/DL (ref 0.6–1.1)
EKG ATRIAL RATE: 86 BPM
EKG DIAGNOSIS: NORMAL
EKG P AXIS: 17 DEGREES
EKG P-R INTERVAL: 140 MS
EKG Q-T INTERVAL: 382 MS
EKG QRS DURATION: 90 MS
EKG QTC CALCULATION (BAZETT): 457 MS
EKG R AXIS: -3 DEGREES
EKG T AXIS: -3 DEGREES
EKG VENTRICULAR RATE: 86 BPM
EOSINOPHILS ABSOLUTE: 0 K/UL (ref 0–0.6)
EOSINOPHILS RELATIVE PERCENT: 0.4 %
ESTIMATED AVERAGE GLUCOSE: 111.2 MG/DL
GFR AFRICAN AMERICAN: >60
GFR NON-AFRICAN AMERICAN: >60
GLOBULIN: 2.7 G/DL
GLUCOSE BLD-MCNC: 175 MG/DL (ref 70–99)
GLUCOSE BLD-MCNC: 275 MG/DL (ref 70–99)
HBA1C MFR BLD: 5.5 %
HCT VFR BLD CALC: 36.3 % (ref 36–48)
HEMOGLOBIN: 11.9 G/DL (ref 12–16)
LYMPHOCYTES ABSOLUTE: 0.8 K/UL (ref 1–5.1)
LYMPHOCYTES RELATIVE PERCENT: 11.7 %
MAGNESIUM: 1.9 MG/DL (ref 1.8–2.4)
MCH RBC QN AUTO: 29.6 PG (ref 26–34)
MCHC RBC AUTO-ENTMCNC: 32.7 G/DL (ref 31–36)
MCV RBC AUTO: 90.4 FL (ref 80–100)
MONOCYTES ABSOLUTE: 0.1 K/UL (ref 0–1.3)
MONOCYTES RELATIVE PERCENT: 0.7 %
NEUTROPHILS ABSOLUTE: 6.1 K/UL (ref 1.7–7.7)
NEUTROPHILS RELATIVE PERCENT: 86.9 %
PDW BLD-RTO: 14.7 % (ref 12.4–15.4)
PERFORMED ON: ABNORMAL
PLATELET # BLD: 233 K/UL (ref 135–450)
PMV BLD AUTO: 8.5 FL (ref 5–10.5)
POTASSIUM SERPL-SCNC: 4.4 MMOL/L (ref 3.5–5.1)
RBC # BLD: 4.02 M/UL (ref 4–5.2)
SODIUM BLD-SCNC: 137 MMOL/L (ref 136–145)
TOTAL PROTEIN: 6.3 G/DL (ref 6.4–8.2)
TROPONIN: <0.01 NG/ML
WBC # BLD: 7 K/UL (ref 4–11)

## 2021-10-17 PROCEDURE — G0378 HOSPITAL OBSERVATION PER HR: HCPCS

## 2021-10-17 PROCEDURE — 83735 ASSAY OF MAGNESIUM: CPT

## 2021-10-17 PROCEDURE — 85025 COMPLETE CBC W/AUTO DIFF WBC: CPT

## 2021-10-17 PROCEDURE — 86140 C-REACTIVE PROTEIN: CPT

## 2021-10-17 PROCEDURE — 93010 ELECTROCARDIOGRAM REPORT: CPT | Performed by: INTERNAL MEDICINE

## 2021-10-17 PROCEDURE — 94680 O2 UPTK RST&XERS DIR SIMPLE: CPT

## 2021-10-17 PROCEDURE — 6370000000 HC RX 637 (ALT 250 FOR IP): Performed by: STUDENT IN AN ORGANIZED HEALTH CARE EDUCATION/TRAINING PROGRAM

## 2021-10-17 PROCEDURE — 2580000003 HC RX 258: Performed by: STUDENT IN AN ORGANIZED HEALTH CARE EDUCATION/TRAINING PROGRAM

## 2021-10-17 PROCEDURE — 6360000002 HC RX W HCPCS: Performed by: STUDENT IN AN ORGANIZED HEALTH CARE EDUCATION/TRAINING PROGRAM

## 2021-10-17 PROCEDURE — 84484 ASSAY OF TROPONIN QUANT: CPT

## 2021-10-17 PROCEDURE — 94760 N-INVAS EAR/PLS OXIMETRY 1: CPT

## 2021-10-17 PROCEDURE — 6370000000 HC RX 637 (ALT 250 FOR IP): Performed by: INTERNAL MEDICINE

## 2021-10-17 PROCEDURE — 83036 HEMOGLOBIN GLYCOSYLATED A1C: CPT

## 2021-10-17 PROCEDURE — 80053 COMPREHEN METABOLIC PANEL: CPT

## 2021-10-17 PROCEDURE — 36415 COLL VENOUS BLD VENIPUNCTURE: CPT

## 2021-10-17 RX ORDER — SODIUM CHLORIDE 9 MG/ML
25 INJECTION, SOLUTION INTRAVENOUS PRN
Status: DISCONTINUED | OUTPATIENT
Start: 2021-10-17 | End: 2021-10-17 | Stop reason: HOSPADM

## 2021-10-17 RX ORDER — FERROUS SULFATE TAB EC 324 MG (65 MG FE EQUIVALENT) 324 (65 FE) MG
324 TABLET DELAYED RESPONSE ORAL
Status: DISCONTINUED | OUTPATIENT
Start: 2021-10-17 | End: 2021-10-17 | Stop reason: HOSPADM

## 2021-10-17 RX ORDER — BUSPIRONE HYDROCHLORIDE 15 MG/1
30 TABLET ORAL 2 TIMES DAILY
Status: DISCONTINUED | OUTPATIENT
Start: 2021-10-17 | End: 2021-10-17

## 2021-10-17 RX ORDER — SODIUM CHLORIDE 0.9 % (FLUSH) 0.9 %
5-40 SYRINGE (ML) INJECTION EVERY 12 HOURS SCHEDULED
Status: DISCONTINUED | OUTPATIENT
Start: 2021-10-17 | End: 2021-10-17 | Stop reason: HOSPADM

## 2021-10-17 RX ORDER — QUETIAPINE FUMARATE 100 MG/1
100 TABLET, FILM COATED ORAL NIGHTLY
COMMUNITY
Start: 2021-10-07

## 2021-10-17 RX ORDER — DEXTROSE MONOHYDRATE 50 MG/ML
100 INJECTION, SOLUTION INTRAVENOUS PRN
Status: DISCONTINUED | OUTPATIENT
Start: 2021-10-17 | End: 2021-10-17 | Stop reason: HOSPADM

## 2021-10-17 RX ORDER — DEXTROSE MONOHYDRATE 25 G/50ML
12.5 INJECTION, SOLUTION INTRAVENOUS PRN
Status: DISCONTINUED | OUTPATIENT
Start: 2021-10-17 | End: 2021-10-17 | Stop reason: HOSPADM

## 2021-10-17 RX ORDER — DULOXETIN HYDROCHLORIDE 60 MG/1
60 CAPSULE, DELAYED RELEASE ORAL DAILY
COMMUNITY

## 2021-10-17 RX ORDER — CYCLOBENZAPRINE HCL 10 MG
10 TABLET ORAL 3 TIMES DAILY PRN
Status: DISCONTINUED | OUTPATIENT
Start: 2021-10-17 | End: 2021-10-17 | Stop reason: HOSPADM

## 2021-10-17 RX ORDER — OXYCODONE HYDROCHLORIDE 5 MG/1
5 TABLET ORAL EVERY 12 HOURS PRN
Status: DISCONTINUED | OUTPATIENT
Start: 2021-10-17 | End: 2021-10-17

## 2021-10-17 RX ORDER — NICOTINE POLACRILEX 4 MG
15 LOZENGE BUCCAL PRN
Status: DISCONTINUED | OUTPATIENT
Start: 2021-10-17 | End: 2021-10-17 | Stop reason: HOSPADM

## 2021-10-17 RX ORDER — ACETAMINOPHEN 650 MG/1
650 SUPPOSITORY RECTAL EVERY 6 HOURS PRN
Status: DISCONTINUED | OUTPATIENT
Start: 2021-10-17 | End: 2021-10-17 | Stop reason: HOSPADM

## 2021-10-17 RX ORDER — LISINOPRIL 10 MG/1
10 TABLET ORAL DAILY
Status: DISCONTINUED | OUTPATIENT
Start: 2021-10-17 | End: 2021-10-17 | Stop reason: HOSPADM

## 2021-10-17 RX ORDER — CLONAZEPAM 1 MG/1
1 TABLET ORAL 2 TIMES DAILY
COMMUNITY

## 2021-10-17 RX ORDER — ARIPIPRAZOLE 5 MG/1
5 TABLET ORAL DAILY
Status: DISCONTINUED | OUTPATIENT
Start: 2021-10-17 | End: 2021-10-17 | Stop reason: HOSPADM

## 2021-10-17 RX ORDER — ACETAMINOPHEN 325 MG/1
650 TABLET ORAL EVERY 6 HOURS PRN
Status: DISCONTINUED | OUTPATIENT
Start: 2021-10-17 | End: 2021-10-17 | Stop reason: HOSPADM

## 2021-10-17 RX ORDER — DULOXETIN HYDROCHLORIDE 60 MG/1
60 CAPSULE, DELAYED RELEASE ORAL DAILY
Status: DISCONTINUED | OUTPATIENT
Start: 2021-10-17 | End: 2021-10-17 | Stop reason: HOSPADM

## 2021-10-17 RX ORDER — CYCLOBENZAPRINE HCL 10 MG
5 TABLET ORAL 2 TIMES DAILY PRN
Status: DISCONTINUED | OUTPATIENT
Start: 2021-10-17 | End: 2021-10-17

## 2021-10-17 RX ORDER — ONDANSETRON 2 MG/ML
4 INJECTION INTRAMUSCULAR; INTRAVENOUS EVERY 6 HOURS PRN
Status: DISCONTINUED | OUTPATIENT
Start: 2021-10-17 | End: 2021-10-17 | Stop reason: HOSPADM

## 2021-10-17 RX ORDER — DEXAMETHASONE 6 MG/1
6 TABLET ORAL DAILY
Status: DISCONTINUED | OUTPATIENT
Start: 2021-10-17 | End: 2021-10-17 | Stop reason: HOSPADM

## 2021-10-17 RX ORDER — IBUPROFEN 400 MG/1
400 TABLET ORAL ONCE
Status: COMPLETED | OUTPATIENT
Start: 2021-10-17 | End: 2021-10-17

## 2021-10-17 RX ORDER — OXYCODONE HYDROCHLORIDE 5 MG/1
5 TABLET ORAL EVERY 6 HOURS PRN
Status: DISCONTINUED | OUTPATIENT
Start: 2021-10-17 | End: 2021-10-17 | Stop reason: HOSPADM

## 2021-10-17 RX ORDER — DEXAMETHASONE 6 MG/1
6 TABLET ORAL DAILY
Qty: 9 TABLET | Refills: 0 | Status: SHIPPED | OUTPATIENT
Start: 2021-10-18 | End: 2021-10-27

## 2021-10-17 RX ORDER — SODIUM CHLORIDE 0.9 % (FLUSH) 0.9 %
5-40 SYRINGE (ML) INJECTION PRN
Status: DISCONTINUED | OUTPATIENT
Start: 2021-10-17 | End: 2021-10-17 | Stop reason: HOSPADM

## 2021-10-17 RX ORDER — VENLAFAXINE HYDROCHLORIDE 37.5 MG/1
37.5 CAPSULE, EXTENDED RELEASE ORAL DAILY
Status: DISCONTINUED | OUTPATIENT
Start: 2021-10-17 | End: 2021-10-17

## 2021-10-17 RX ORDER — CLONAZEPAM 1 MG/1
1 TABLET ORAL 2 TIMES DAILY
Status: DISCONTINUED | OUTPATIENT
Start: 2021-10-17 | End: 2021-10-17 | Stop reason: HOSPADM

## 2021-10-17 RX ADMIN — CYCLOBENZAPRINE 10 MG: 10 TABLET, FILM COATED ORAL at 06:33

## 2021-10-17 RX ADMIN — SODIUM CHLORIDE, PRESERVATIVE FREE 10 ML: 5 INJECTION INTRAVENOUS at 08:00

## 2021-10-17 RX ADMIN — ENOXAPARIN SODIUM 30 MG: 30 INJECTION SUBCUTANEOUS at 07:59

## 2021-10-17 RX ADMIN — OXYCODONE 5 MG: 5 TABLET ORAL at 10:26

## 2021-10-17 RX ADMIN — IBUPROFEN 400 MG: 400 TABLET, FILM COATED ORAL at 11:58

## 2021-10-17 RX ADMIN — FERROUS SULFATE TAB EC 324 MG (65 MG FE EQUIVALENT) 324 MG: 324 (65 FE) TABLET DELAYED RESPONSE at 07:59

## 2021-10-17 RX ADMIN — DULOXETINE HYDROCHLORIDE 60 MG: 60 CAPSULE, DELAYED RELEASE ORAL at 08:00

## 2021-10-17 RX ADMIN — DEXAMETHASONE SODIUM PHOSPHATE 10 MG: 4 INJECTION, SOLUTION INTRAMUSCULAR; INTRAVENOUS at 00:30

## 2021-10-17 RX ADMIN — ARIPIPRAZOLE 5 MG: 5 TABLET ORAL at 08:50

## 2021-10-17 RX ADMIN — SODIUM CHLORIDE, PRESERVATIVE FREE 10 ML: 5 INJECTION INTRAVENOUS at 07:58

## 2021-10-17 RX ADMIN — ONDANSETRON 4 MG: 2 INJECTION INTRAMUSCULAR; INTRAVENOUS at 04:16

## 2021-10-17 RX ADMIN — LISINOPRIL 10 MG: 10 TABLET ORAL at 08:00

## 2021-10-17 RX ADMIN — INSULIN LISPRO 1 UNITS: 100 INJECTION, SOLUTION INTRAVENOUS; SUBCUTANEOUS at 10:38

## 2021-10-17 RX ADMIN — OXYCODONE HYDROCHLORIDE 5 MG: 5 TABLET ORAL at 04:17

## 2021-10-17 RX ADMIN — CLONAZEPAM 1 MG: 1 TABLET ORAL at 09:52

## 2021-10-17 RX ADMIN — DEXAMETHASONE 6 MG: 6 TABLET ORAL at 07:59

## 2021-10-17 ASSESSMENT — PAIN DESCRIPTION - PAIN TYPE: TYPE: ACUTE PAIN

## 2021-10-17 ASSESSMENT — PAIN DESCRIPTION - ONSET: ONSET: ON-GOING

## 2021-10-17 ASSESSMENT — PAIN SCALES - GENERAL
PAINLEVEL_OUTOF10: 8
PAINLEVEL_OUTOF10: 5
PAINLEVEL_OUTOF10: 5
PAINLEVEL_OUTOF10: 7
PAINLEVEL_OUTOF10: 7

## 2021-10-17 ASSESSMENT — PAIN DESCRIPTION - FREQUENCY: FREQUENCY: CONTINUOUS

## 2021-10-17 ASSESSMENT — PAIN DESCRIPTION - DESCRIPTORS: DESCRIPTORS: ACHING

## 2021-10-17 ASSESSMENT — PAIN DESCRIPTION - LOCATION: LOCATION: GENERALIZED

## 2021-10-17 NOTE — ACP (ADVANCE CARE PLANNING)
Advance Care Planning     Advance Care Planning Activator (Inpatient)  Conversation Note      Date of ACP Conversation: 10/17/2021     Conversation Conducted with: Patient with Decision Making Capacity    ACP Activator: Rigo Burroughs RN    Health Care Decision Maker:     Current Designated Health Care Decision Maker:     Primary Decision Maker: Ariana Sophia [facility] - [phone]    Care Preferences    Ventilation: \"If you were in your present state of health and suddenly became very ill and were unable to breathe on your own, what would your preference be about the use of a ventilator (breathing machine) if it were available to you? \"      Would the patient desire the use of ventilator (breathing machine)?: yes    \"If your health worsens and it becomes clear that your chance of recovery is unlikely, what would your preference be about the use of a ventilator (breathing machine) if it were available to you? \"     Would the patient desire the use of ventilator (breathing machine)?: Yes      Resuscitation  \"CPR works best to restart the heart when there is a sudden event, like a heart attack, in someone who is otherwise healthy. Unfortunately, CPR does not typically restart the heart for people who have serious health conditions or who are very sick. \"    \"In the event your heart stopped as a result of an underlying serious health condition, would you want attempts to be made to restart your heart (answer \"yes\" for attempt to resuscitate) or would you prefer a natural death (answer \"no\" for do not attempt to resuscitate)? \" yes       [] Yes   [x] No   Educated Patient / Estel Rich regarding differences between Advance Directives and portable DNR orders.     Length of ACP Conversation in minutes: 5 minutes     Conversation Outcomes:  [x] ACP discussion completed  [] Existing advance directive reviewed with patient; no changes to patient's previously recorded wishes  [] New Advance Directive completed  [] Portable Do Not Rescitate prepared for Provider review and signature  [] POLST/POST/MOLST/MOST prepared for Provider review and signature      Follow-up plan:    [] Schedule follow-up conversation to continue planning  [] Referred individual to Provider for additional questions/concerns   [] Advised patient/agent/surrogate to review completed ACP document and update if needed with changes in condition, patient preferences or care setting    [x] This note routed to one or more involved healthcare providers    Electronically signed by Gasper Grossman RN Case Management [phone] on 10/17/2021 at 11:07 AM

## 2021-10-17 NOTE — ED PROVIDER NOTES
[address] [facility]      Pt Name: Concepcion Mejia  MRN: [id number]  [place] 1985  Date of evaluation: 10/16/2021  Provider: Zulema Ramirez [facility]  Chief Complaint   Patient presents with    Positive For Covid-19     x3 week; increase sob; worse after \"covid infusion\"       I have fully participated in the care of Concepcion Mejia and have had a face-to-face evaluation. I have reviewed and agree with all pertinent clinical information, and midlevel provider's history, and physical exam. I have also reviewed the labs and imaging studies and treatment plan. I have also reviewed and agree with the medications, allergies and past medical history section for this Concepcion Mejia. I agree with the diagnosis, and I concur. I wore personal protective equipment when I was in the room the entire time. This includes gloves, N95 mask, face shield, and a glove over my stethoscope for protection. Past Medical History:   Diagnosis Date    Anxiety     Chronic back pain     Chronic bronchitis (HCC)     not medically treated    Degenerative disc disease     Gastric bypass status for obesity 10/28/2014    Grief     LOST HER FIANCE 4-2014    Hypertension     pt states no longer has since weight loss    IBS (irritable bowel syndrome)     MDRO (multiple drug resistant organisms) resistance     MRSA (methicillin resistant Staphylococcus aureus) infection 2010    right side of abdomen    Nausea & vomiting     \"Horrible post-op: n/v/ HA    Prolonged emergence from general anesthesia        MDM:  Concepcion Mejia is a 43 y.o. female who presents with shortness of breath and recently diagnosed with Covid 9 days ago. She denies any fever chills. She has had increasing shortness of breath. She has been coughing. She took the Regeneron infusion and got worse. She is tachycardic in the emergency department.   Remainder exam is unremarkable. Lungs are clear to auscultation with decreased breath sounds. There is no edema of her lower extremities. CT scan of her chest was ordered. It revealed  No evidence of pulmonary embolus. Therefore, patient was admitted to hospital for further evaluation and treatment for hypoxia. Her oxygen level dropped down to 85% while ambulating. She remained stable throughout her emergency department stay. Vitals:    10/16/21 2245   BP: 122/82   Pulse: 84   Resp: 22   Temp: 98.7 °F (37.1 °C)   SpO2: 97%       Lab results  Labs Reviewed   BASIC METABOLIC PANEL W/ REFLEX TO MG FOR LOW K - Abnormal; Notable for the following components:       Result Value    Glucose 130 (*)     All other components within normal limits    Narrative:     Performed at:  [address] [address]   Phone [phone]   HCG, SERUM, QUALITATIVE    Narrative:     Performed at:  [place] [address]   Phone [phone]   CBC WITH AUTO DIFFERENTIAL    Narrative:     Performed at:  [address] [address]   Phone [phone]   TROPONIN       Radiology results  CT CHEST PULMONARY EMBOLISM W CONTRAST   Final Result   No evidence of pulmonary embolism or acute pulmonary abnormality.          XR CHEST PORTABLE   Final Result   No acute cardiopulmonary process                 Medications   Dexamethasone Sodium Phosphate injection 10 mg (has no administration in time range)   0.9 % sodium chloride bolus (0 mLs IntraVENous Stopped 10/16/21 2243)   iopamidol (ISOVUE-370) 76 % injection 75 mL (75 mLs IntraVENous Given 10/16/21 2101)   ondansetron (ZOFRAN) injection 4 mg (4 mg IntraVENous Given 10/16/21 2200)   acetaminophen (TYLENOL) tablet 1,000 mg (1,000 mg Oral Given 10/16/21 2244)       New Prescriptions    No medications on file       The patient's blood pressure was found to be elevated according to CMS/Medicare and the Affordable Care Act/ObamaCare criteria. Elevated blood pressure could occur because of pain or anxiety or other reasons and does not mean that they need to have their blood pressure treated or medications otherwise adjusted. However, this could also be a sign that they will need to have their blood pressure treated or medications changed. The patient was instructed to follow up closely with their personal physician to have their blood pressure rechecked. The patient was instructed to take a list of recent blood pressure readings to their next visit with their personal physician. IMPRESSIONS:  1. Hypoxia    2.  David,   10/16/21 9902

## 2021-10-17 NOTE — PROGRESS NOTES
Discharge instruction went over with pt, all questions answered. IV flushed and discontinued, no complications. New medications and side effects went over with pt, stated understanding. Pt requesting shower and to eat prior to d/c. Pt waiting on transportation.  Electronically signed by Clement Chou RN on 10/17/2021 at 12:59 PM

## 2021-10-17 NOTE — ED TRIAGE NOTES
Jose Raul Rodriguez is a 43 y.o. female brought herself to the ER for eval of SOB. The patient got a positive COVID test wed and has increased SOB the last two days. The patient has a history of asthma. The patient is alert and oriented complaining of a headache and body aches.

## 2021-10-17 NOTE — PLAN OF CARE
Problem: Pain:  Goal: Pain level will decrease  Description: Pain level will decrease  Outcome: Ongoing  Goal: Control of acute pain  Description: Control of acute pain  Outcome: Ongoing  Goal: Control of chronic pain  Description: Control of chronic pain  Outcome: Ongoing     Problem: Falls - Risk of:  Goal: Will remain free from falls  Description: Will remain free from falls  Outcome: Ongoing  Goal: Absence of physical injury  Description: Absence of physical injury  Outcome: Ongoing     Problem: Airway Clearance - Ineffective  Goal: Achieve or maintain patent airway  Outcome: Ongoing     Problem: Gas Exchange - Impaired  Goal: Absence of hypoxia  Outcome: Ongoing  Goal: Promote optimal lung function  Outcome: Ongoing     Problem: Breathing Pattern - Ineffective  Goal: Ability to achieve and maintain a regular respiratory rate  Outcome: Ongoing     Problem:  Body Temperature -  Risk of, Imbalanced  Goal: Ability to maintain a body temperature within defined limits  Outcome: Ongoing  Goal: Will regain or maintain usual level of consciousness  Outcome: Ongoing  Goal: Complications related to the disease process, condition or treatment will be avoided or minimized  Outcome: Ongoing     Problem: Isolation Precautions - Risk of Spread of Infection  Goal: Prevent transmission of infection  Outcome: Ongoing     Problem: Nutrition Deficits  Goal: Optimize nutritional status  Outcome: Ongoing     Problem: Risk for Fluid Volume Deficit  Goal: Maintain normal heart rhythm  Outcome: Ongoing  Goal: Maintain absence of muscle cramping  Outcome: Ongoing  Goal: Maintain normal serum potassium, sodium, calcium, phosphorus, and pH  Outcome: Ongoing     Problem: Loneliness or Risk for Loneliness  Goal: Demonstrate positive use of time alone when socialization is not possible  Outcome: Ongoing     Problem: Fatigue  Goal: Verbalize increase energy and improved vitality  Outcome: Ongoing     Problem: Patient Education: Go to Patient Education Activity  Goal: Patient/Family Education  Outcome: Ongoing

## 2021-10-17 NOTE — PLAN OF CARE
Problem: Pain:  Goal: Control of acute pain  Description: Control of acute pain  10/17/2021 1035 by Simon Lombardi RN  Note: Pt educated on using pain scale. Pt given PRN pain medication per Dr orders see Rubi Soto. Pt agreeable to pain management. Problem: Falls - Risk of:  Goal: Will remain free from falls  Description: Will remain free from falls  10/17/2021 1035 by Simon Lombardi RN  Outcome: Ongoing  Note: Fall precautions in place. Bed in lowest position, wheels locked, call light in reach, non slip socks on, alarm armed. Pt educated on using call light for assistance when needing to get up. Pt agreeable.

## 2021-10-17 NOTE — ED NOTES
.ED SBAR report provider to Cynthia dawson RN. Patient to be transported to Room 4267 via stretcher by [facility] RN.       Chanda Lovett, [facility]  10/17/21 8381

## 2021-10-17 NOTE — H&P
Diagnosis Date    Anxiety     Chronic back pain     Chronic bronchitis (HCC)     not medically treated    Degenerative disc disease     Gastric bypass status for obesity 10/28/2014    Grief     LOST HER FIANCE     Hypertension     pt states no longer has since weight loss    IBS (irritable bowel syndrome)     MDRO (multiple drug resistant organisms) resistance     MRSA (methicillin resistant Staphylococcus aureus) infection     right side of abdomen    Nausea & vomiting     \"Horrible post-op: n/v/ HA    Prolonged emergence from general anesthesia        Past Surgical History:        Procedure Laterality Date     SECTION      CHOLECYSTECTOMY      [address] OF UTERUS  2010     hysteroscopy, with novasure ablation    GASTRIC BYPASS SURGERY  3-2014    HYSTERECTOMY  14    ROBOTIC SUPRACERVICAL HYSTERECTOMY WITH BILATERAL SALPINGECTOMY AND REMOVAL OF PERINEUM CONDYLOMA    TOENAIL EXCISION Right 2016    Winograd partial nail matrixectomy of the right great toe medial nail fold    TUBAL LIGATION      WISDOM TOOTH EXTRACTION         Medications Prior to Admission:    Prior to Admission medications    Medication Sig Start Date End Date Taking? Authorizing Provider   clonazePAM (KLONOPIN) 1 MG tablet Take 1 mg by mouth 2 times daily.    Yes Historical Provider, MD   DULoxetine (CYMBALTA) 60 MG extended release capsule Take 60 mg by mouth daily   Yes Historical Provider, MD   ARIPiprazole (ABILIFY) 5 MG tablet Take 5 mg by mouth daily   Yes Historical Provider, MD   etodolac (LODINE) 500 MG tablet Take 500 mg by mouth 2 times daily   Yes Historical Provider, MD   albuterol sulfate  (90 Base) MCG/ACT inhaler Inhale 2 puffs into the lungs 10/29/18  Yes Historical Provider, MD   ondansetron (ZOFRAN ODT) 4 MG disintegrating tablet Take 1 tablet by mouth every 8 hours as needed for Nausea or Vomiting 16  Yes Long Leader, ISIDRO   oxyCODONE (ROXICODONE) 5 MG immediate release tablet Take 5 mg by mouth every 6 hours as needed for Pain. Yes Historical Provider, MD   lisinopril (PRINIVIL;ZESTRIL) 10 MG tablet Take 10 mg by mouth daily At bedtime   Yes Historical Provider, MD   metoclopramide (REGLAN) 5 MG tablet   Take 5 mg by mouth 2 times daily    Yes Historical Provider, MD   Multiple Vitamins-Minerals (THERAPEUTIC MULTIVITAMIN-MINERALS) tablet Take 1 tablet by mouth daily. Yes Historical Provider, MD   Cyanocobalamin (VITAMIN B-12) 5000 MCG SUBL Place under the tongue daily    Yes Historical Provider, MD   cyclobenzaprine (FLEXERIL) 10 MG tablet Take 10 mg by mouth 3 times daily as needed    Yes Historical Provider, MD   ferrous sulfate 325 (65 FE) MG tablet Take 325 mg by mouth daily (with breakfast). Yes Historical Provider, MD   venlafaxine (EFFEXOR XR) 37.5 MG extended release capsule Take 37.5 mg by mouth daily    Historical Provider, MD   hyoscyamine (LEVSIN/SL) 125 MCG sublingual tablet hyoscyamine 0.125 mg sublingual tablet    1 tablet as needed by sublingual route.  6/1/16   Historical Provider, MD   risperiDONE (RISPERDAL) 0.25 MG tablet qid 3/31/19   Historical Provider, MD   sennosides-docusate sodium (SENOKOT-S) 8.6-50 MG tablet Take 1 tablet by mouth 2 times daily 1/20/18   FRANCISCO Horowitz - CNP   ibuprofen (ADVIL;MOTRIN) 600 MG tablet Take 1 tablet by mouth every 6 hours as needed for Pain 7/28/16   Radha Shah DPM   diphenoxylate-atropine (LOMOTIL) 2.5-0.025 MG per tablet Take 1 tablet by mouth 3 times daily as needed for Diarrhea    Historical Provider, MD   simethicone (MYLICON) 80 MG chewable tablet Take 160 mg by mouth 2 times daily    Historical Provider, MD   potassium chloride (KLOR-CON) 20 MEQ packet Take 10 mEq by mouth daily     Historical Provider, MD   busPIRone (BUSPAR) 15 MG tablet Take 30 mg by mouth 2 times daily     Historical Provider, MD   Cholecalciferol (VITAMIN D3) 2000 UNITS CAPS Take  by mouth daily.    Historical Provider, MD   ondansetron (ZOFRAN-ODT) 4 MG disintegrating tablet Take 4 mg by mouth every 8 hours as needed for Nausea. Historical Provider, MD       Allergies:  Patient has no known allergies. Social History:  The patient currently lives home    TOBACCO:   reports that she has been smoking cigarettes. She has a 22.50 pack-year smoking history. She has never used smokeless tobacco.  ETOH:   reports no history of alcohol use. Family History:  Reviewed in detail and negative for DM, Early CAD, Cancer, CVA. Positive as follows:        Problem Relation Age of Onset    High Blood Pressure Mother     Cancer Father     Depression Father     Asthma Father        REVIEW OF SYSTEMS:   as noted in the HPI. All other systems reviewed and negative. PHYSICAL EXAM:    /75   Pulse 84   Temp 97.7 °F (36.5 °C) (Oral)   Resp 18   Ht 5' 3\" (1.6 m)   Wt 266 lb 12.1 oz (121 kg)   LMP 05/04/2014   SpO2 93%   BMI 47.25 kg/m²     General appearance: Fatigued appearing, somewhat ill-appearing but currently no acute respiratory distress and alert and oriented x4  HEENT Normal cephalic, atraumatic without obvious deformity. Pupils equal, round, and reactive to light. Extra ocular muscles intact. Conjunctivae/corneas clear. Dry mucous membranes  Neck: Supple, no JVD  Lungs: Diminished breath sounds but seems to be overall clear  Heart: Regular rate and rhythm with Normal S1/S2 without murmurs, rubs or gallops, point of maximum impulse non-displaced  Abdomen: Soft, non-tender or non-distended without rigidity or guarding and positive bowel sounds all four quadrants. Extremities: No edema noted bilaterally to lower extremity  Skin: No rashes  Neurologic: Grossly intact neurologically  Mental status: Alert, oriented, thought content appropriate.   Capillary Refill: Acceptable  < 3 seconds  Peripheral Pulses: +3 Easily felt, not easily obliterated with pressure      Chest x-ray: No acute process  CT chest pulmonary embolism with IV contrast: No evidence of pulmonary embolism or acute pulmonary abnormality    CBC   Recent Labs     10/16/21  2006   WBC 6.9   HGB 12.3   HCT 37.9         RENAL  Recent Labs     10/16/21  2006      K 4.1      CO2 23   BUN 13   CREATININE 0.6     LFT'S  No results for input(s): AST, ALT, ALB, BILIDIR, BILITOT, ALKPHOS in the last 72 hours. COAG  No results for input(s): INR in the last 72 hours. CARDIAC ENZYMES  No results for input(s): CKTOTAL, CKMB, CKMBINDEX, TROPONINI in the last 72 hours. U/A:    Lab Results   Component Value Date    COLORU YELLOW 04/19/2019    WBCUA 1 04/19/2019    RBCUA 1 04/19/2019    MUCUS 1+ 01/09/2016    BACTERIA RARE 01/09/2016    CLARITYU CLOUDY 04/19/2019    SPECGRAV 1.026 04/19/2019    LEUKOCYTESUR Negative 04/19/2019    BLOODU Negative 04/19/2019    GLUCOSEU Negative 04/19/2019       ABG  No results found for: YNJ6JRF, BEART, C8DOSJNC, PHART, THGBART, FGI3ZPL, PO2ART, HCI7PHT        Active Hospital Problems    Diagnosis Date Noted    Pneumonia due to COVID-19 virus [U07.1, J12.82] 10/17/2021         PHYSICIANS CERTIFICATION:    I certify that Joyce Jason is expected to be hospitalized for less than 2 midnights based on the following assessment and plan:      ASSESSMENT/PLAN:  · Pneumonia due to COVID-19 virus    Plan:  · Mainly noted to have exertional dyspnea in the ED, otherwise patient seems to be saturating well on room air. Patient did already received Regeneron therapy 2 weeks ago via her primary care physician as an outpatient and had tested positive 3 weeks ago with a home Covid test.  · We will start patient on Decadron therapy daily for now  · Restart patient's home medications  · Repeat labs in the morning  · If patient is still stable in the morning and may only require exertional oxygen, can consider discharging patient home    DVT Prophylaxis: Lovenox  Diet: ADULT DIET;  Regular; 4 carb choices (60

## 2021-10-17 NOTE — ED PROVIDER NOTES
[facility] Emergency Department    CHIEF COMPLAINT  Positive For Covid-19 (x3 week; increase sob; worse after \"covid infusion\")      SHARED SERVICE VISIT  I have seen and evaluated this patient with my supervising physician, Dr. Corey Palacios. HISTORY OF PRESENT ILLNESS  Mark Ying is a 43 y.o. nontoxic, unwell appearing, distressed female with a medical history including, but not limited to, anxiety, back pain, chronic bronchitis, DDD, gastric bypass hypertension, IBS, MRSA, and nausea with vomiting who presents to the ED complaining of increased shortness of breath, \"throbbing\" 9/10 diffuse headache, nausea, lightheadedness, presyncope, diaphoresis, dry cough, chills, changes in smell and taste, and bilateral lower extremity edema since she received general on 2  ago after being diagnosed with COVID-19 3 weeks ago. Denies chest pain, dizziness, hemoptysis, leg/calf pain, abdominal pain, diarrhea, or urinary symptoms. No other complaints, modifying factors or associated symptoms. Nursing notes reviewed and I agree.    Past Medical History:   Diagnosis Date    Anxiety     Chronic back pain     Chronic bronchitis (HCC)     not medically treated    Degenerative disc disease     Gastric bypass status for obesity 10/28/2014    Grief     LOST HER FIANCE     Hypertension     pt states no longer has since weight loss    IBS (irritable bowel syndrome)     MDRO (multiple drug resistant organisms) resistance     MRSA (methicillin resistant Staphylococcus aureus) infection     right side of abdomen    Nausea & vomiting     \"Horrible post-op: n/v/ HA    Prolonged emergence from general anesthesia      Past Surgical History:   Procedure Laterality Date     SECTION      CHOLECYSTECTOMY      DILATION AND CURETTAGE OF UTERUS  2010     hysteroscopy, with novasure ablation    GASTRIC BYPASS SURGERY  3-    HYSTERECTOMY  14    ROBOTIC SUPRACERVICAL HYSTERECTOMY WITH BILATERAL SALPINGECTOMY AND REMOVAL OF PERINEUM CONDYLOMA    TOENAIL EXCISION Right 07/28/2016    Winograd partial nail matrixectomy of the right great toe medial nail fold    TUBAL LIGATION  2007    WISDOM TOOTH EXTRACTION       Family History   Problem Relation Age of Onset    High Blood Pressure Mother     Cancer Father     Depression Father     Asthma Father      Social History     Socioeconomic History    Marital status: Single     Spouse name: Not on file    Number of children: Not on file    Years of education: Not on file    Highest education level: Not on file   Occupational History    Not on file   Tobacco Use    Smoking status: Current Every Day Smoker     Packs/day: 1.50     Years: 15.00     Pack years: 22.50     Types: Cigarettes    Smokeless tobacco: Never Used    Tobacco comment: QUIT FOR 10 YEARS, RESTARTED 4-2014   Vaping Use    Vaping Use: Never used   Substance and Sexual Activity    Alcohol use: No    Drug use: No    Sexual activity: Yes     Partners: Male   Other Topics Concern    Not on file   Social History Narrative    Not on file     Social Determinants of Health     Financial Resource Strain:     Difficulty of Paying Living Expenses:    Food Insecurity:     Worried About Running Out of Food in the Last Year:     Ran Out of Food in the Last Year:    Transportation Needs:     Lack of Transportation (Medical):      Lack of Transportation (Non-Medical):    Physical Activity:     Days of Exercise per Week:     Minutes of Exercise per Session:    Stress:     Feeling of Stress :    Social Connections:     Frequency of Communication with Friends and Family:     Frequency of Social Gatherings with Friends and Family:     Attends Congregational Services:     Active Member of Clubs or Organizations:     Attends Club or Organization Meetings:     Marital Status:    Intimate Partner Violence:     Fear of Current or Ex-Partner:     Emotionally Abused:     Physically Abused:     Sexually Abused:      Current Facility-Administered Medications   Medication Dose Route Frequency Provider Last Rate Last Admin    ARIPiprazole (ABILIFY) tablet 5 mg  5 mg Oral Daily [facility], DO        ferrous sulfate EC tablet 324 mg  324 mg Oral Daily with breakfast [facility]        lisinopril (PRINIVIL;ZESTRIL) tablet 10 mg  10 mg Oral Daily [facility], DO        oxyCODONE (ROXICODONE) immediate release tablet 5 mg  5 mg Oral Q12H PRN [facility], DO   5 mg at 10/17/21 0417    sodium chloride flush 0.9 % injection 5-40 mL  5-40 mL IntraVENous 2 times per day [facility], DO        sodium chloride flush 0.9 % injection 5-40 mL  5-40 mL IntraVENous PRN [facility], DO        0.9 % sodium chloride infusion  25 mL IntraVENous PRN [facility], DO        enoxaparin (LOVENOX) injection 30 mg  30 mg SubCUTAneous BID [facility], DO        acetaminophen (TYLENOL) tablet 650 mg  650 mg Oral Q6H PRN [facility], DO        Or    acetaminophen (TYLENOL) suppository 650 mg  650 mg Rectal Q6H PRN [facility], DO        ondansetron (ZOFRAN) injection 4 mg  4 mg IntraVENous Q6H PRN [facility], DO   4 mg at 10/17/21 0416    dexamethasone (DECADRON) tablet 6 mg  6 mg Oral Daily [facility]        cyclobenzaprine (FLEXERIL) tablet 10 mg  10 mg Oral TID PRN [facility], DO        DULoxetine (CYMBALTA) extended release capsule 60 mg  60 mg Oral Daily [facility], DO         No Known Allergies    REVIEW OF SYSTEMS  12 systems reviewed, pertinent positives per HPI otherwise noted to be negative    PHYSICAL EXAM  BP (!) 134/95   Pulse 93   Temp 97.7 °F (36.5 °C) (Oral)   Resp 18   Ht 5' 3\" (1.6 m)   Wt 266 lb 12.1 oz (121 kg)   LMP 05/04/2014   SpO2 93%   BMI 47.25 kg/m²   GENERAL APPEARANCE: Awake and alert. Cooperative.  + Distress. Non- toxic in appearance. HEAD: Normocephalic. Atraumatic. EYES: PERRL. EOM's grossly intact. ENT: Mucous membranes are moist.   NECK: Supple. HEART: RRR. No murmurs, rubs, or gallops.  + S1-S2.  LUNGS:  Respirations mildly labored. CTAB. Moderate air exchange. Not speaking comfortably in full sentences. ABDOMEN: Soft. Non-distended. Non-tender. No guarding or rebound.  + Bowel sounds x 4 quadrants. No masses. No organomegaly. EXTREMITIES: Trace bilateral peripheral edema. Moves all extremities equally. All extremities neurovascularly intact. SKIN: Warm and dry. No acute rashes. NEUROLOGICAL: Alert and oriented. CN's 2-12 intact. No gross facial drooping. Strength 5/5, sensation intact. PSYCHIATRIC: Normal mood and affect. RADIOLOGY  XR CHEST PORTABLE    Result Date: 10/16/2021  EXAMINATION: ONE XRAY VIEW OF THE CHEST 10/16/2021 8:11 pm COMPARISON: 06/03/2018 HISTORY: ORDERING SYSTEM PROVIDED HISTORY: Shortness of breath TECHNOLOGIST PROVIDED HISTORY: Reason for exam:->Shortness of breath Reason for Exam: Shortness of breath, Positive For Covid-19 Acuity: Acute Type of Exam: Initial FINDINGS: The cardiomediastinal silhouette is normal in size and contour. The lungs are clear. No pleural effusion or pneumothorax is present. No acute cardiopulmonary process     CT CHEST PULMONARY EMBOLISM W CONTRAST    Result Date: 10/16/2021  EXAMINATION: CTA OF THE CHEST 10/16/2021 8:37 pm TECHNIQUE: CTA of the chest was performed after the administration of intravenous contrast.  Multiplanar reformatted images are provided for review. MIP images are provided for review. Dose modulation, iterative reconstruction, and/or weight based adjustment of the mA/kV was utilized to reduce the radiation dose to as low as reasonably achievable.  COMPARISON: 10/16/2021 HISTORY: ORDERING SYSTEM PROVIDED HISTORY: covid +/tachy/sob TECHNOLOGIST PROVIDED HISTORY: Reason for exam:->covid +/tachy/sob Decision Support Exception - unselect if not a suspected or confirmed emergency medical condition->Emergency Medical Condition (MA) Reason for Exam: covid +/tachy/sob Acuity: Acute Type of Exam: Initial FINDINGS: Pulmonary Arteries: Pulmonary arteries are adequately opacified for evaluation. No evidence of intraluminal filling defect to suggest pulmonary embolism. Main pulmonary artery is normal in caliber. Mediastinum: No evidence of mediastinal lymphadenopathy. Heart is normal size of pericardial effusion aorta remarkable caliber. Lungs/pleura: The lungs are without acute process. No focal consolidation or pulmonary edema. No evidence of pleural effusion or pneumothorax. Upper Abdomen: Gastric bypass surgery. Soft Tissues/Bones: Degenerate change     No evidence of pulmonary embolism or acute pulmonary abnormality. ED COURSE  Patient received Tylenol for pain, with good relief. Triage vitals stable but hypertensive at 134/95 mmHg. A cardiac/PE workup was initiated including troponin, CBC, BMP, hCG qualitative, CXR, and CT PE.            Labs Ordered:  I have reviewed and interpreted all of the currently available lab results from this visit:  Results for orders placed or performed during the hospital encounter of 10/16/21   HCG Qualitative, Serum   Result Value Ref Range    hCG Qual Negative Detects HCG level >10 MIU/mL   CBC Auto Differential   Result Value Ref Range    WBC 6.9 4.0 - 11.0 K/uL    RBC 4.21 4.00 - 5.20 M/uL    Hemoglobin 12.3 12.0 - 16.0 g/dL    Hematocrit 37.9 36.0 - 48.0 %    MCV 89.9 80.0 - 100.0 fL    MCH 29.2 26.0 - 34.0 pg    MCHC 32.4 31.0 - 36.0 g/dL    RDW 14.2 12.4 - 15.4 %    Platelets 278 622 - 951 K/uL    MPV 8.1 5.0 - 10.5 fL    Neutrophils % 60.6 %    Lymphocytes % 27.3 %    Monocytes % 6.8 %    Eosinophils % 4.0 %    Basophils % 1.3 %    Neutrophils Absolute 4.2 1.7 - 7.7 K/uL    Lymphocytes Absolute 1.9 1.0 - 5.1 K/uL    Monocytes Absolute 0.5 0.0 - 1.3 K/uL    Eosinophils Absolute 0.3 0.0 - 0.6 K/uL    Basophils Absolute 0.1 0.0 - 0.2 K/uL   Basic Metabolic Panel w/ Reflex to MG   Result Value Ref Range    Sodium 137 136 - 145 mmol/L    Potassium reflex Magnesium 4.1 3.5 - 5.1 mmol/L    Chloride 103 99 - 110 mmol/L    CO2 23 21 - 32 mmol/L    Anion Gap 11 3 - 16    Glucose 130 (H) 70 - 99 mg/dL    BUN 13 7 - 20 mg/dL    CREATININE 0.6 0.6 - 1.1 mg/dL    GFR Non-African American >60 >60    GFR African American >60 >60    Calcium 9.1 8.3 - 10.6 mg/dL           Imaging ordered:  XR CHEST PORTABLE    Result Date: 10/16/2021  EXAMINATION: ONE XRAY VIEW OF THE CHEST 10/16/2021 8:11 pm COMPARISON: 06/03/2018 HISTORY: ORDERING SYSTEM PROVIDED HISTORY: Shortness of breath TECHNOLOGIST PROVIDED HISTORY: Reason for exam:->Shortness of breath Reason for Exam: Shortness of breath, Positive For Covid-19 Acuity: Acute Type of Exam: Initial FINDINGS: The cardiomediastinal silhouette is normal in size and contour. The lungs are clear. No pleural effusion or pneumothorax is present. No acute cardiopulmonary process     CT CHEST PULMONARY EMBOLISM W CONTRAST    Result Date: 10/16/2021  EXAMINATION: CTA OF THE CHEST 10/16/2021 8:37 pm TECHNIQUE: CTA of the chest was performed after the administration of intravenous contrast.  Multiplanar reformatted images are provided for review. MIP images are provided for review. Dose modulation, iterative reconstruction, and/or weight based adjustment of the mA/kV was utilized to reduce the radiation dose to as low as reasonably achievable. COMPARISON: 10/16/2021 HISTORY: ORDERING SYSTEM PROVIDED HISTORY: covid +/tachy/sob TECHNOLOGIST PROVIDED HISTORY: Reason for exam:->covid +/tachy/sob Decision Support Exception - unselect if not a suspected or confirmed emergency medical condition->Emergency Medical Condition (MA) Reason for Exam: covid +/tachy/sob Acuity: Acute Type of Exam: Initial FINDINGS: Pulmonary Arteries: Pulmonary arteries are adequately opacified for evaluation.   No evidence of intraluminal filling defect to suggest pulmonary embolism. Main pulmonary artery is normal in caliber. Mediastinum: No evidence of mediastinal lymphadenopathy. Heart is normal size of pericardial effusion aorta remarkable caliber. Lungs/pleura: The lungs are without acute process. No focal consolidation or pulmonary edema. No evidence of pleural effusion or pneumothorax. Upper Abdomen: Gastric bypass surgery. Soft Tissues/Bones: Degenerate change     No evidence of pulmonary embolism or acute pulmonary abnormality. Reevaluation:  On reevaluation I find the 78-year-old female resting company on stretcher with eyes open with stable vital signs. However, she was ambulated shortly prior to reevaluation and found to be [email or web address] on room air with minimal exertion status post she was walked from one side of the bed to the other. A discussion was had with Mrs. Michele De La Torre regarding hypoxia, COVID-19 infection, and intention to admit. Risk management discussed and shared decision making had with patient and/or surrogate. All questions were answered. Patient will be admitted to the hospital for further evaluation and treatment. Patient is agreeable to plan for admission. MDM  Patient presents to the emergency department with shortness of breath. Alternate diagnoses are less likely based on history and physical. Considered acute coronary syndrome, pulmonary embolism, COPD/asthma, sepsis, pericardial tamponade, pneumothorax, CHF, thoracic aortic dissection, anxiety, among others less likely based on history physical.    Patient's presentation is concerning for cardiopulmonary etiology including pulmonary embolism as she endorses lightheadedness, presyncope, shortness of breath. She was tachycardic upon arrival at 104 bpm.  Therefore, CT PE study was obtained but negative. However, the patient desaturates with minimal exertion here in the emergency department will be admitted to the hospital for further evaluation and treatment. Patient is agreeable with plan for admission. Interventions: Patient was placed on cardiac monitoring, given a liter bolus of normal saline for diuresis and for tachycardic heart rate at 104 bpm, Tylenol 1 g p.o. for headache, and Zofran 4 mg IV for nausea. After fluids patient's heart rate normalized. Therefore:    My attending physician nor I feel that the patient is safe for appropriate discharged home. She'll be admitted to the hospital for further evaluation and treatment. Patient is agreeable to plan for admission. Clinical Impression:  Hypoxiaupon minimal exertion    COVID-19diagnosed 2 weeks ago. General: 2 5 days ago and worse since      Disposition:  Admitted      Patient will be admitted to hospital for further evaluation and treatment. Hospitalist: Dr. Zachery Davis      Discussed patients HPI, ED work-up, results, treatment, and response with my attending physician Dr. Jenna Hartmann and the Hospitalist -Dr. Zachery Davis who agrees to admit the patient to the hospital.            [facility]    This chart was created using Dragon dictation. Every effort was made by myself to ensure accuracy, however due to limitations of this technology errors may be present.        FRANCISCO Joya - CNP  10/17/21 0559

## 2021-10-17 NOTE — CARE COORDINATION
INITIAL CASE MANAGEMENT ASSESSMENT    Unable to meet with patient due to isolation status. Call to patient's room, spoke with patient over the phone to assess possible discharge needs. Explained Case Management role/services. Living Situation: Confirmed address, lives with family in a 2 level house    ADLs: Independent     DME: None    PT/OT Recs: Not ordered, independent in room     Active Services: None     Transportation: Family transport home     Medications: Uses [facility] on [address] -- no issues    PCP: Dorys Washington MD      HD/PD: n/a    PLAN/COMMENTS: Patient will discharge home today. No needs. Patient did not qualify for home oxygen. CM provided contact information for patient or family to call with any questions. CM will follow and assist as needed.   Electronically signed by Leslie Ribeiro RN Case Management [phone] on 10/17/2021 at 11:08 AM

## 2021-10-17 NOTE — PROGRESS NOTES
4 Eyes Skin Assessment     NAME:  Christine Chaney  YOB: 1985  MEDICAL RECORD NUMBER:  [id number]    The patient is being assess for  Admission    I agree that 2 RN's have performed a thorough Head to Toe Skin Assessment on the patient. ALL assessment sites listed below have been assessed. Areas assessed by both nurses:    Head, Face, Ears, Shoulders, Back, Chest, Arms, Elbows, Hands, Sacrum. Buttock, Coccyx, Ischium and Legs. Feet and Heels        Does the Patient have a Wound?  No noted wound(s)       Inocente Prevention initiated:  No   Wound Care Orders initiated:  No    Pressure Injury (Stage 3,4, Unstageable, DTI, NWPT, and Complex wounds) if present place consult order under [de-identified] No    New and Established Ostomies if present place consult order under : No      Nurse 1 eSignature: Electronically signed by Tushar Piedra RN on 10/17/21 at 5:09 AM EDT    **SHARE this note so that the co-signing nurse is able to place an eSignature**    Nurse 2 eSignature: Electronically signed by Gloria Solis RN on 10/17/21 at 5:16 AM EDT

## 2021-10-17 NOTE — PROGRESS NOTES
Patient admitted to room 4275 from ER. VSS, spO2 >90% room air. C/o generalized body aches and nausea for past 3 days. Pt is dyspneic with exertion and at rest. telemetry placed with continuous O2 monitoring. Ambulates independently, proning encouraged during periods of rest. Oriented to room, call light within reach and calls appropriately.

## 2021-10-17 NOTE — PROGRESS NOTES
[facility]    Respiratory Therapy   Home Oxygen Evaluation        Name: Yecenia Barr  Medical Record Number: [id number]  Age: 43 y.o. Gender:  female   : 1985  Today's date: 10/17/2021  Room: D4G-3551/4275-01      Assessment        /80   Pulse 103   Temp 97.5 °F (36.4 °C) (Oral)   Resp 18   Ht 5' 3\" (1.6 m)   Wt 271 lb 6.2 oz (123.1 kg)   LMP 2014   SpO2 96%   BMI 48.07 kg/m²     Patient Active Problem List   Diagnosis    Heavy menstrual bleeding    Cholelithiasis    Bariatric surgery status    Gastric bypass status for obesity    Symptomatic cholelithiasis    Chronic tonsillitis    Depression    Esophageal reflux    LAUREN (generalized anxiety disorder)    Hypertrophy of tonsils alone    Morbid obesity (HCC)    Pneumonia due to COVID-19 virus       Social History:  Social History     Tobacco Use    Smoking status: Current Every Day Smoker     Packs/day: 1.50     Years: 15.00     Pack years: 22.50     Types: Cigarettes    Smokeless tobacco: Never Used    Tobacco comment: QUIT FOR 10 YEARS, RESTARTED    Vaping Use    Vaping Use: Never used   Substance Use Topics    Alcohol use: No    Drug use: No       Patient Room Air saturation at rest 96  %  Patient Room Air saturation upon ambulation 100 %    Oxygen saturations of 88% or less on RA qualifies patient for Home Oxygen    Patient does not need home oxygen. In your clinical assessment does the Patient Require Portable Oxygen Tanks?     No:                 Patient/caregiver was educated on Home Oxygen process:  Yes      Level of patient/caregiver understanding able to:   [x] Verbalize understanding   [] Demonstrate understanding       [] Teach back        [] Needs reinforcement        []  No available caregiver               []  Other:     Response to education:  Good     Time Spent with Home O2 Set Up:  10  minutes     Chari Schneider RCP on 10/17/2021 at 9:25 AM

## 2021-10-17 NOTE — ED NOTES
Ambulated patient around her bed with pulse oximetry, the patients SpO2 dropped to 86% and the patient became SOB walking nursing home around the bed. The patient then sat down and SpO2 climbed back to 100% ED provider notified.       [address]  10/16/21 7030

## 2021-10-17 NOTE — PROGRESS NOTES
Pharmacy Medication Reconciliation Note     List of medications patient is currently taking is complete. Source of information:   1. Phone conversation with patient  2.  EMR      Evette Hill PharmD, BCPS  10/17/2021  10:28 AM

## 2021-10-17 NOTE — ED NOTES
Bed: C-20  Expected date: 10/16/21  Expected time: 8:02 PM  Means of arrival: Walk In  Comments:  [facility], GISELLE  10/16/21 2008

## 2021-10-17 NOTE — DISCHARGE SUMMARY
Hospital Medicine Discharge Summary    Patient ID: Concepcion Mejia      Patient's PCP: Tammy Rhodes MD    Admit Date: 10/16/2021     Discharge Date:   10/17/2020    Admitting Physician: Madhavi Guerra DO     Discharge Physician: Sarah Figueroa MD     Discharge Diagnoses: Active Hospital Problems    Diagnosis Date Noted    Pneumonia due to COVID-19 virus [U07.1, J12.82] 10/17/2021       The patient was seen and examined on day of discharge and this discharge summary is in conjunction with any daily progress note from day of discharge. Hospital Course: Patient is 42-year-old female with history of anxiety, chronic pain opioid dependence, previous gastric bypass surgery diagnosed with COVID-19 3 weeks ago came into ER with a complaint of progressive shortness of breath with exertion. Patient was seen and examined on day of discharge reported that her symptoms has been better. Dyspnea is back to baseline. Denies any nausea, vomiting, fever, chills. She was tearful as she was talking to her mother over the phone. Discussed with patient that she would benefit from sleep study as an outpatient. Patient was given prescription for Decadron as she was hypoxic with ambulation. Evaluated by respiratory therapist no need of oxygen at time of discharge          Physical Exam Performed:     /80   Pulse 103   Temp 97.5 °F (36.4 °C) (Oral)   Resp 18   Ht 5' 3\" (1.6 m)   Wt 271 lb 6.2 oz (123.1 kg)   LMP 05/04/2014   SpO2 96%   BMI 48.07 kg/m²       General appearance: Appears to be older than his stated age, obese, tearful  HEENT:  Normal cephalic, atraumatic without obvious deformity. Pupils equal, round, and reactive to light. Extra ocular muscles intact. Conjunctivae/corneas clear. Neck: Supple, with full range of motion. No jugular venous distention. Trachea midline. Respiratory:  Normal respiratory effort.  Clear to auscultation, mild expiratory wheeze   cardiovascular:  Regular rate and rhythm with normal S1/S2 without murmurs, rubs or gallops. Abdomen: Soft, non-tender, non-distended with normal bowel sounds. Musculoskeletal:  No clubbing, cyanosis or edema bilaterally. Full range of motion without deformity. Skin: Skin color, texture, turgor normal.  No rashes or lesions. Neurologic:  Neurovascularly intact without any focal sensory/motor deficits. Cranial nerves: II-XII intact, grossly non-focal.  Psychiatric:  Alert and oriented, tearful  Capillary Refill: Brisk,< 3 seconds   Peripheral Pulses: +2 palpable, equal bilaterally       Labs: For convenience and continuity at follow-up the following most recent labs are provided:      CBC:    Lab Results   Component Value Date    WBC 7.0 10/17/2021    HGB 11.9 10/17/2021    HCT 36.3 10/17/2021     10/17/2021       Renal:    Lab Results   Component Value Date     10/17/2021    K 4.4 10/17/2021    K 4.1 10/16/2021     10/17/2021    CO2 20 10/17/2021    BUN 11 10/17/2021    CREATININE 0.6 10/17/2021    CALCIUM 8.9 10/17/2021         Significant Diagnostic Studies    Radiology:   CT CHEST PULMONARY EMBOLISM W CONTRAST   Final Result   No evidence of pulmonary embolism or acute pulmonary abnormality. XR CHEST PORTABLE   Final Result   No acute cardiopulmonary process                Consults:     IP CONSULT TO HOSPITALIST  IP CONSULT TO PHARMACY    Disposition: Home    Condition at Discharge: Stable    Discharge Instructions/Follow-up: PCP    Code Status:  Full Code     Activity: activity as tolerated    Diet: cardiac diet      Discharge Medications:     Current Discharge Medication List           Details   dexamethasone (DECADRON) 6 MG tablet Take 1 tablet by mouth daily for 9 doses  Qty: 9 tablet, Refills: 0              Details   clonazePAM (KLONOPIN) 1 MG tablet Take 1 mg by mouth 2 times daily.       DULoxetine (CYMBALTA) 60 MG extended release capsule Take 60 mg by mouth daily      ARIPiprazole (ABILIFY) 5 MG tablet Take 5 mg by mouth daily      etodolac (LODINE) 500 MG tablet Take 500 mg by mouth 2 times daily      albuterol sulfate  (90 Base) MCG/ACT inhaler Inhale 2 puffs into the lungs every 4 hours as needed       !! ondansetron (ZOFRAN ODT) 4 MG disintegrating tablet Take 1 tablet by mouth every 8 hours as needed for Nausea or Vomiting  Qty: 25 tablet, Refills: 1      oxyCODONE (ROXICODONE) 5 MG immediate release tablet Take 5 mg by mouth every 6 hours as needed for Pain. lisinopril (PRINIVIL;ZESTRIL) 10 MG tablet Take 10 mg by mouth nightly At bedtime       Cholecalciferol (VITAMIN D3) 2000 UNITS CAPS Take  by mouth daily. metoclopramide (REGLAN) 5 MG tablet Take 5 mg by mouth 2 times daily as needed       Multiple Vitamins-Minerals (THERAPEUTIC MULTIVITAMIN-MINERALS) tablet Take 1 tablet by mouth daily. Cyanocobalamin (VITAMIN B-12) 5000 MCG SUBL Place under the tongue daily       cyclobenzaprine (FLEXERIL) 10 MG tablet Take 10 mg by mouth 3 times daily as needed       ferrous sulfate 325 (65 FE) MG tablet Take 325 mg by mouth daily (with breakfast). QUEtiapine (SEROQUEL) 100 MG tablet Take 100 mg by mouth nightly      sennosides-docusate sodium (SENOKOT-S) 8.6-50 MG tablet Take 1 tablet by mouth 2 times daily  Qty: 60 tablet, Refills: 0      ibuprofen (ADVIL;MOTRIN) 600 MG tablet Take 1 tablet by mouth every 6 hours as needed for Pain  Qty: 120 tablet, Refills: 3      !! ondansetron (ZOFRAN-ODT) 4 MG disintegrating tablet Take 4 mg by mouth every 8 hours as needed for Nausea. !! - Potential duplicate medications found. Please discuss with provider. Time Spent on discharge is more than 20 minutes in the examination, evaluation, counseling and review of medications and discharge plan.     This chart was likely completed using voice recognition technology and may contain unintended grammatical , phraseology,and/or punctuation errors    Signed:    Reymundo Coppola MD   10/17/2021      Thank you Ruby Navas MD for the opportunity to be involved in this patient's care. If you have any questions or concerns please feel free to contact me at [phone].

## 2021-10-17 NOTE — DISCHARGE INSTR - COC
5' 3\" (1.6 m)   Wt 271 lb 6.2 oz (123.1 kg)   LMP 2014   SpO2 96%   BMI 48.07 kg/m²     Last documented pain score (0-10 scale): Pain Level: 7  Last Weight:   Wt Readings from Last 1 Encounters:   10/17/21 271 lb 6.2 oz (123.1 kg)     Mental Status:  {IP PT MENTAL STATUS:62978}    IV Access:  { WILBUR IV ACCESS:261332437}    Nursing Mobility/ADLs:  Walking   {CHP DME AIDU:948412410}  Transfer  {CHP DME LLFI:207426810}  Bathing  {CHP DME KLLX:310867942}  Dressing  {CHP DME WDBL:507764419}  Toileting  {CHP DME ENLZ:836646942}  Feeding  {CHP DME IPUD:221042461}  Med Admin  {P DME TVGN:066825051}  Med Delivery   { WILBUR MED Delivery:317508257}    Wound Care Documentation and Therapy:        Elimination:  Continence:   · Bowel: {YES / FY:57389}  · Bladder: {YES / QN:46140}  Urinary Catheter: {Urinary Catheter:169669997}   Colostomy/Ileostomy/Ileal Conduit: {YES / CX:18662}       Date of Last BM: ***    Intake/Output Summary (Last 24 hours) at 10/17/2021 1106  Last data filed at 10/17/2021 0431  Gross per 24 hour   Intake 1480 ml   Output    Net 1480 ml     I/O last 3 completed shifts:   In: 18 [P.O.:480; IV Piggyback:1000]  Out: -     Safety Concerns:     508 MYFX Safety Concerns:684770994}    Impairments/Disabilities:      508 MYFX Impairments/Disabilities:216932389}    Nutrition Therapy:  Current Nutrition Therapy:   508 MYFX Diet List:661541888}    Routes of Feeding: {P DME Other Feedings:719476790}  Liquids: {Slp liquid thickness:78791}  Daily Fluid Restriction: {CHP DME Yes amt example:547733932}  Last Modified Barium Swallow with Video (Video Swallowing Test): {Done Not Done XXCP:651624468}    Treatments at the Time of Hospital Discharge:   Respiratory Treatments: ***  Oxygen Therapy:  {Therapy; copd oxygen:57830}  Ventilator:    {SHAW CHEN Vent CZER:988579070}    Rehab Therapies: {THERAPEUTIC INTERVENTION:6318922684}  Weight Bearing Status/Restrictions: {SHAW CHEN Weight Bearin}  Other Medical Equipment (for information only, NOT a DME order):  {EQUIPMENT:904440650}  Other Treatments: ***    Patient's personal belongings (please select all that are sent with patient):  {CHP DME Belongings:171115346}    RN SIGNATURE:  {Esignature:199618719}    CASE MANAGEMENT/SOCIAL WORK SECTION    Inpatient Status Date: ***    Readmission Risk Assessment Score:  Readmission Risk              Risk of Unplanned Readmission:  0           Discharging to Facility/ Agency   · Name:   · Address:  · Phone:  · Fax:    Dialysis Facility (if applicable)   · Name:  · Address:  · Dialysis Schedule:  · Phone:  · Fax:    / signature: {Esignature:233441500}    PHYSICIAN SECTION    Prognosis: {Prognosis:9607813147}    Condition at Discharge: [address] [place] [address] Patient Condition:141463325}    Rehab Potential (if transferring to Rehab): {Prognosis:2515146656}    Recommended Labs or Other Treatments After Discharge: ***    Physician Certification: I certify the above information and transfer of Mica Bias  is necessary for the continuing treatment of the diagnosis listed and that she requires {Admit to Appropriate Level of Care:17152} for {GREATER/LESS:360279529} 30 days.      Update Admission H&P: {CHP DME Changes in HDOEU:625808309}    PHYSICIAN SIGNATURE:  {Esignature:125662610}

## 2021-10-18 ENCOUNTER — CARE COORDINATION (OUTPATIENT)
Dept: CASE MANAGEMENT | Age: 36
End: 2021-10-18

## 2021-10-18 NOTE — CARE COORDINATION
[facility] Transitions Initial Follow Up Call    Call within 2 business days of discharge: Yes    Patient: Jason Lugo Patient : 1985   MRN: [id number]  Reason for Admission: PNA d/t COVID+  Discharge Date: 10/17/21 RARS: No data recorded    Last Discharge [facility]       Complaint Diagnosis Description Type Department Provider    10/16/21 Positive For Covid-19 Hypoxia . .. ED to Hosp-Admission (Discharged) (ADMITTED) Rhiannon Cuba MD; [address]. .. Spoke with: DEMOND    Facility: [facility]    Attempted to reach patient via phone for initial post hospital transition call. VM left stating purpose of call along with my contact information requesting a return call. Received incoming call from patient that sounded very SOB. Patient verified . States that she is having a hard time breathing. LPN CC asked patient if she had pulse ox, she states yes. States oxygen is going back and forth between 80s and 90s. LPN CC encouraged patient that if her oxygen is falling below 90% that she should return to ED. Patient verbalized understanding. LPN CC asked patient if she had a way to ED, states \"kind of.\" LPN CC offered to assist patient in calling 911. Patient declined. States she would get to ED soon. Call ended at this time. LPN CC to f/u at another time. Zoe Cano LPN [address] [place] [address]  [phone]    Care Transitions 24 Hour Call    Were you discharged with any Home Care or Post Acute Services: No  Care Transitions Interventions         Follow Up  No future appointments.     Zoe Cano LPN

## 2021-10-19 ENCOUNTER — CARE COORDINATION (OUTPATIENT)
Dept: CASE MANAGEMENT | Age: 36
End: 2021-10-19

## 2021-10-19 NOTE — CARE COORDINATION
[facility] Transitions Initial Follow Up Call    Call within 2 business days of discharge: Yes    Patient: Hampton Fleischer Patient : 1985   MRN: [id number]  Reason for Admission: PNA d/t COVID+  Discharge Date: 10/17/21 RARS: No data recorded    Last Discharge [address] [address]       Complaint Diagnosis Description Type Department Provider    10/16/21 Positive For Covid-19 Hypoxia . .. ED to Hosp-Admission (Discharged) (ADMITTED) Mendez Grijalva MD; [facility]. .. Spoke with: [address] [facility] [address]: [facility]    Non-face-to-face services provided:  Obtained and reviewed discharge summary and/or continuity of care documents    Transitions of Care Initial Call    Was this an external facility discharge? No Discharge Facility: NA    Challenges to be reviewed by the provider   Additional needs identified to be addressed with provider: No  none             Method of communication with provider : phone      Advance Care Planning:   Does patient have an Advance Directive: reviewed and current. Advance Care Planning   Healthcare Decision Maker:    Primary Decision Maker: Ariana Cortes - [facility] [phone]    Was this a readmission? No  Patient stated reason for admission: worsening COVID sx  Patients top risk factors for readmission: medical condition-PNA d/t COVID+    Care Transition Nurse (CTN) contacted the patient by telephone to perform post hospital discharge assessment. Verified name and  with patient as identifiers. Provided introduction to self, and explanation of the CTN role. CTN reviewed discharge instructions, medical action plan and red flags with patient who verbalized understanding. Patient given an opportunity to ask questions and does not have any further questions or concerns at this time. Were discharge instructions available to patient? Yes. Reviewed appropriate site of care based on symptoms and resources available to patient including: PCP.  The knows it's probably not enough. Denies problems with bowels or bladder. Unable to perform full medication reconciliation as patient was quite fatigued and SOB. LPN CC again encouraged patient to return to ED with O2 sats below 90% and worsening COVID sx. Patient declined at this time stating she is resting and is afraid to go back. States she is in contact with PCP, but has no f/u at this time. Denies any acute needs at present time. Agreeable to f/u calls. Educated on the use of urgent care or physicians 24 hr access line if assistance is needed after hours. LPN CC provided contact information. Plan for follow-up call in 5-7 days based on severity of symptoms and risk factors. Rachana Mcdonald LPN [address] [place] [address]  [phone]    Care Transitions 24 Hour Call    Were you discharged with any Home Care or Post Acute Services: No  Care Transitions Interventions         Follow Up  No future appointments.     Rachana Mcdonald LPN

## 2021-10-25 ENCOUNTER — CARE COORDINATION (OUTPATIENT)
Dept: CASE MANAGEMENT | Age: 36
End: 2021-10-25

## 2021-11-02 ENCOUNTER — CARE COORDINATION (OUTPATIENT)
Dept: CASE MANAGEMENT | Age: 36
End: 2021-11-02

## 2021-11-02 NOTE — CARE COORDINATION
[facility] Transitions Follow Up Call    2021    Patient: Gogo Magallanes  Patient : 1985   MRN: <[id number]>  Reason for Admission:  PNA d/t COVID+  Discharge Date: 10/17/21 RARS: No data recorded    Spoke with: Gogo Magallanes who reports that she is still having sob with activity and recent noted bp is unstable. Patient states that b/p has been running in the low 90/50s and HR has been elevated from 115 at rest to 205 with activity. Writer advised that with any abnormal s/s the her PCP should be notified. Patient refused 3 way call and stated that she will contact PCP. Patient denies cp, cough, dizziness, headache, n/v, diarrhea, abdominal pains, fever, or chills. Patient report that appetite and fluid intake is good and denies any problems with bowel or bladder. Denies any needs at this time. Patient instructed to continue to monitor s/s, reporting any that may present to MD immediately for early intervention. Reminded of COVID 19 precautions. Agreeable to f/u calls. Educated on the use of urgent care for non emergent needs when unable to get in to see PCP. Care Transitions Subsequent and Final Call    Subsequent and Final Calls  Care Transitions Interventions  Other Interventions: Follow Up  No future appointments.     Denisse Marcus LPN

## 2021-11-10 ENCOUNTER — CARE COORDINATION (OUTPATIENT)
Dept: CASE MANAGEMENT | Age: 36
End: 2021-11-10

## 2021-11-10 NOTE — CARE COORDINATION
[facility] Transitions Follow Up Call    11/10/2021    Patient: Josiah Man  Patient : 1985   MRN: <[id number]>  Reason for Admission: PNA d/t COVID  Discharge Date: 10/17/21 RARS: No data recorded       Spoke with: Josiah Man who reports that she is about 85-90% back to normal. Patient reports ongoing symptoms of nausea, sob with exertion, and headaches. Patient reports but it is getting better each day. Patient reports her pulse is still elevated and B/P low for her baseline. She states that she has a VV with PCP tomorrow 21. Patient reports that she has some medication changes over the weekend. Her Abilify and Seroquel was d/c'd and she started Vraylar. Patient reports that she has as lost some wt now her wt is 255. Patient denies cp, cough, dizziness, vomiting, diarrhea, abdominal pains, fever, or chills. Patient report that appetite and fluid intake is good and denies any problems with bowel or bladder. She did say that the new medication curbs her appetite. Denies any needs at this time. Patient instructed to continue to monitor s/s, reporting any that may present to MD immediately for early intervention. Reminded of COVID 19 precautions. Agreeable to f/u calls. Care Transitions Subsequent and Final Call    Subsequent and Final Calls  Do you have any ongoing symptoms?: Yes  Onset of Patient-reported symptoms: Other  Patient-reported symptoms: Nausea, Shortness of Breath, Other  Interventions for patient-reported symptoms: Other  Have your medications changed?: Yes  Patient Reports: d/c abilify and seroquel and started Homer Miguel you have any questions related to your medications?: No  Do you currently have any active services?: No  Do you have any needs or concerns that I can assist you with?: No  Identified Barriers: None  Care Transitions Interventions  No Identified Needs  Other Interventions: Follow Up  No future appointments.     Armani Smith LPN

## 2023-03-27 ENCOUNTER — E-VISIT (OUTPATIENT)
Dept: PRIMARY CARE CLINIC | Age: 38
End: 2023-03-27
Payer: COMMERCIAL

## 2023-03-27 DIAGNOSIS — I73.00 RAYNAUD'S DISEASE WITHOUT GANGRENE: Primary | ICD-10-CM

## 2023-03-27 PROCEDURE — 99423 OL DIG E/M SVC 21+ MIN: CPT | Performed by: NURSE PRACTITIONER

## 2023-03-27 RX ORDER — NIFEDIPINE 30 MG/1
30 TABLET, EXTENDED RELEASE ORAL DAILY
Qty: 30 TABLET | Refills: 0 | Status: SHIPPED | OUTPATIENT
Start: 2023-03-27

## 2023-03-27 NOTE — PROGRESS NOTES
López Burt (1985) initiated an asynchronous digital communication through [address] [place] [address]. HPI: per patient questionnaire     Exam: not applicable    Diagnoses and all orders for this visit:  Diagnoses and all orders for this visit:    Raynaud's disease without gangrene  -     NIFEdipine (PROCARDIA XL) 30 MG extended release tablet; Take 1 tablet by mouth daily  -     HEVER Screen With Reflex; Future  -     Sedimentation Rate; Future    Pt requesting medication change and lab work. Raynauds is worsening. pt concerned. Encourage f/u with pcp     Time: EV3 - 21 or more minutes were spent on the digital evaluation and management of this patient. 30 min     Tomer Toure, APRN - CNP

## 2023-03-29 LAB — SEDIMENTATION RATE, ERYTHROCYTE: 11

## 2023-04-03 DIAGNOSIS — I73.00 RAYNAUD'S DISEASE WITHOUT GANGRENE: ICD-10-CM

## 2023-04-22 DIAGNOSIS — I73.00 RAYNAUD'S DISEASE WITHOUT GANGRENE: ICD-10-CM

## 2023-04-22 RX ORDER — NIFEDIPINE 30 MG/1
30 TABLET, EXTENDED RELEASE ORAL DAILY
Qty: 30 TABLET | Refills: 0 | OUTPATIENT
Start: 2023-04-22

## 2024-07-03 ENCOUNTER — OFFICE VISIT (OUTPATIENT)
Dept: ORTHOPEDIC SURGERY | Age: 39
End: 2024-07-03
Payer: COMMERCIAL

## 2024-07-03 VITALS — BODY MASS INDEX: 39.87 KG/M2 | WEIGHT: 225 LBS | HEIGHT: 63 IN

## 2024-07-03 DIAGNOSIS — M25.511 RIGHT SHOULDER PAIN, UNSPECIFIED CHRONICITY: Primary | ICD-10-CM

## 2024-07-03 PROCEDURE — 99213 OFFICE O/P EST LOW 20 MIN: CPT | Performed by: PHYSICIAN ASSISTANT

## 2024-07-03 PROCEDURE — 4004F PT TOBACCO SCREEN RCVD TLK: CPT | Performed by: PHYSICIAN ASSISTANT

## 2024-07-03 PROCEDURE — G8419 CALC BMI OUT NRM PARAM NOF/U: HCPCS | Performed by: PHYSICIAN ASSISTANT

## 2024-07-03 PROCEDURE — G8427 DOCREV CUR MEDS BY ELIG CLIN: HCPCS | Performed by: PHYSICIAN ASSISTANT

## 2024-07-04 NOTE — PROGRESS NOTES
This dictation was done with Dragon dictation and may contain mechanical errors related to translation.    I have today reviewed with Benita Morley the clinically relevant, past medical history, medications, allergies, family history, social history, and Review Of Systems form the patient’s most recent history form & I have documented any details relevant to today's presenting complaints in my history below. Ms. Benita Morley's self-reported past medical history, medications, allergies, family history, social history, and Review Of Systems form has been scanned into the chart under the \"Media\" tab.    Subjective:  Benita Morley is a 39 y.o. who is here as an established patient complaining of pain in her shoulder.  X-ray start with an initial fall at work about 3 to 4 weeks ago.  Then she had 2 additional falls that aggravate the condition and she has soreness restriction of motion and apprehension related to the base of her right side of her neck going down her arm.  She is developed some adhesive capsulitis.  Initially she went to [facility] they states they would not take her insurance she tried another place that it would take her insurance either she went to urgent care they said she had might have a scapular fracture.  However she ended up.  She has been here in the past and we helped her with her knee.  She was sent for an AP transaxillary view and Y view of her right shoulder      Patient Active Problem List   Diagnosis    Heavy menstrual bleeding    Cholelithiasis    Bariatric surgery status    Gastric bypass status for obesity    Symptomatic cholelithiasis    Chronic tonsillitis    Depression    Esophageal reflux    LAUREN (generalized anxiety disorder)    Hypertrophy of tonsils alone    Morbid obesity (HCC)    Pneumonia due to COVID-19 virus           Current Outpatient Medications on File Prior to Visit   Medication Sig Dispense Refill    ondansetron (ZOFRAN-ODT) 4 MG disintegrating tablet

## 2024-07-15 ENCOUNTER — OFFICE VISIT (OUTPATIENT)
Dept: ORTHOPEDIC SURGERY | Age: 39
End: 2024-07-15
Payer: COMMERCIAL

## 2024-07-15 VITALS — BODY MASS INDEX: 39.87 KG/M2 | HEIGHT: 63 IN | RESPIRATION RATE: 12 BRPM | WEIGHT: 225 LBS

## 2024-07-15 DIAGNOSIS — S46.011A STRAIN OF RIGHT ROTATOR CUFF CAPSULE, INITIAL ENCOUNTER: Primary | ICD-10-CM

## 2024-07-15 DIAGNOSIS — M54.12 CERVICAL RADICULOPATHY: ICD-10-CM

## 2024-07-15 DIAGNOSIS — M25.511 RIGHT SHOULDER PAIN, UNSPECIFIED CHRONICITY: ICD-10-CM

## 2024-07-15 PROCEDURE — G8417 CALC BMI ABV UP PARAM F/U: HCPCS | Performed by: ORTHOPAEDIC SURGERY

## 2024-07-15 PROCEDURE — 99204 OFFICE O/P NEW MOD 45 MIN: CPT | Performed by: ORTHOPAEDIC SURGERY

## 2024-07-15 PROCEDURE — 4004F PT TOBACCO SCREEN RCVD TLK: CPT | Performed by: ORTHOPAEDIC SURGERY

## 2024-07-15 PROCEDURE — G8427 DOCREV CUR MEDS BY ELIG CLIN: HCPCS | Performed by: ORTHOPAEDIC SURGERY

## 2024-07-23 ENCOUNTER — TELEPHONE (OUTPATIENT)
Dept: ORTHOPEDIC SURGERY | Age: 39
End: 2024-07-23

## 2024-07-23 NOTE — TELEPHONE ENCOUNTER
L/m on v/m for patient regarding message received from precert regarding MRI right shoulder and MRI cervical spine ordered 7/15/24.  Insurance cannot be verified per precert department and unable to obtain approvals and/or denials for MRIs.  She was asked to call back to verify current insurance provider, along with member ID.  Awaiting return call.

## 2025-01-20 ENCOUNTER — APPOINTMENT (OUTPATIENT)
Dept: CT IMAGING | Age: 40
End: 2025-01-20
Payer: COMMERCIAL

## 2025-01-20 ENCOUNTER — HOSPITAL ENCOUNTER (EMERGENCY)
Age: 40
Discharge: HOME | End: 2025-01-21
Attending: STUDENT IN AN ORGANIZED HEALTH CARE EDUCATION/TRAINING PROGRAM
Payer: COMMERCIAL
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
## 2025-01-20 LAB
ANION GAP SERPL CALCULATED.3IONS-SCNC: 10 MMOL/L (ref 3–16)
BASOPHILS # BLD: 0.1 K/UL (ref 0–0.2)
BASOPHILS NFR BLD: 0.9 %
BUN SERPL-MCNC: 16 MG/DL (ref 7–20)
CALCIUM SERPL-MCNC: 9.3 MG/DL (ref 8.3–10.6)
CHLORIDE SERPL-SCNC: 104 MMOL/L (ref 99–110)
CO2 SERPL-SCNC: 27 MMOL/L (ref 21–32)
CREAT SERPL-MCNC: 0.8 MG/DL (ref 0.6–1.1)
DEPRECATED RDW RBC AUTO: 13.2 % (ref 12.4–15.4)
EOSINOPHIL # BLD: 0.6 K/UL (ref 0–0.6)
EOSINOPHIL NFR BLD: 6.2 %
GFR SERPLBLD CREATININE-BSD FMLA CKD-EPI: >90 ML/MIN/{1.73_M2}
GLUCOSE SERPL-MCNC: 102 MG/DL (ref 70–99)
HCT VFR BLD AUTO: 39.3 % (ref 36–48)
HGB BLD-MCNC: 13.1 G/DL (ref 12–16)
LYMPHOCYTES # BLD: 3.7 K/UL (ref 1–5.1)
LYMPHOCYTES NFR BLD: 35.3 %
MCH RBC QN AUTO: 30.1 PG (ref 26–34)
MCHC RBC AUTO-ENTMCNC: 33.3 G/DL (ref 31–36)
MCV RBC AUTO: 90.5 FL (ref 80–100)
MONOCYTES # BLD: 0.7 K/UL (ref 0–1.3)
MONOCYTES NFR BLD: 6.5 %
NEUTROPHILS # BLD: 5.3 K/UL (ref 1.7–7.7)
NEUTROPHILS NFR BLD: 51.1 %
PLATELET # BLD AUTO: 296 K/UL (ref 135–450)
PMV BLD AUTO: 7.5 FL (ref 5–10.5)
POTASSIUM SERPL-SCNC: 4.1 MMOL/L (ref 3.5–5.1)
RBC # BLD AUTO: 4.34 M/UL (ref 4–5.2)
SODIUM SERPL-SCNC: 141 MMOL/L (ref 136–145)
WBC # BLD AUTO: 10.4 K/UL (ref 4–11)

## 2025-01-20 PROCEDURE — 72125 CT NECK SPINE W/O DYE: CPT

## 2025-01-20 PROCEDURE — 70450 CT HEAD/BRAIN W/O DYE: CPT

## 2025-01-20 PROCEDURE — 74177 CT ABD & PELVIS W/CONTRAST: CPT

## 2025-01-20 PROCEDURE — 99285 EMERGENCY DEPT VISIT HI MDM: CPT

## 2025-01-20 PROCEDURE — 80048 BASIC METABOLIC PNL TOTAL CA: CPT

## 2025-01-20 PROCEDURE — 6360000004 HC RX CONTRAST MEDICATION: Performed by: NURSE PRACTITIONER

## 2025-01-20 PROCEDURE — 6370000000 HC RX 637 (ALT 250 FOR IP): Performed by: NURSE PRACTITIONER

## 2025-01-20 PROCEDURE — 85025 COMPLETE CBC W/AUTO DIFF WBC: CPT

## 2025-01-20 RX ORDER — IOPAMIDOL 755 MG/ML
75 INJECTION, SOLUTION INTRAVASCULAR
Status: COMPLETED | OUTPATIENT
Start: 2025-01-20 | End: 2025-01-20

## 2025-01-20 RX ORDER — ACETAMINOPHEN 500 MG
1000 TABLET ORAL ONCE
Status: COMPLETED | OUTPATIENT
Start: 2025-01-20 | End: 2025-01-20

## 2025-01-20 RX ORDER — OXYCODONE HYDROCHLORIDE 5 MG/1
5 TABLET ORAL ONCE
Status: COMPLETED | OUTPATIENT
Start: 2025-01-20 | End: 2025-01-21

## 2025-01-20 RX ADMIN — IOPAMIDOL 75 ML: 755 INJECTION, SOLUTION INTRAVENOUS at 22:38

## 2025-01-20 RX ADMIN — ACETAMINOPHEN 1000 MG: 500 TABLET ORAL at 22:14

## 2025-01-20 ASSESSMENT — PAIN SCALES - GENERAL
PAINLEVEL_OUTOF10: 8
PAINLEVEL_OUTOF10: 8

## 2025-01-20 ASSESSMENT — PAIN DESCRIPTION - LOCATION: LOCATION: NECK

## 2025-01-20 ASSESSMENT — ENCOUNTER SYMPTOMS
NAUSEA: 0
SHORTNESS OF BREATH: 0
DIARRHEA: 0
CHEST TIGHTNESS: 0
VOMITING: 0
ABDOMINAL PAIN: 0

## 2025-01-20 ASSESSMENT — PAIN - FUNCTIONAL ASSESSMENT: PAIN_FUNCTIONAL_ASSESSMENT: 0-10

## 2025-01-21 VITALS
OXYGEN SATURATION: 95 % | DIASTOLIC BLOOD PRESSURE: 71 MMHG | HEART RATE: 75 BPM | RESPIRATION RATE: 18 BRPM | SYSTOLIC BLOOD PRESSURE: 112 MMHG | TEMPERATURE: 97.6 F

## 2025-01-21 PROCEDURE — 6370000000 HC RX 637 (ALT 250 FOR IP): Performed by: NURSE PRACTITIONER

## 2025-01-21 RX ORDER — LIDOCAINE 50 MG/G
1 PATCH TOPICAL DAILY
Qty: 10 PATCH | Refills: 0 | Status: SHIPPED | OUTPATIENT
Start: 2025-01-21 | End: 2025-01-31

## 2025-01-21 RX ORDER — METHOCARBAMOL 750 MG/1
750 TABLET, FILM COATED ORAL 4 TIMES DAILY
Qty: 40 TABLET | Refills: 0 | Status: SHIPPED | OUTPATIENT
Start: 2025-01-21 | End: 2025-01-31

## 2025-01-21 RX ADMIN — OXYCODONE 5 MG: 5 TABLET ORAL at 00:30

## 2025-01-21 ASSESSMENT — PAIN SCALES - GENERAL: PAINLEVEL_OUTOF10: 8

## 2025-01-21 ASSESSMENT — PAIN DESCRIPTION - LOCATION: LOCATION: NECK
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
## 2025-05-23 ENCOUNTER — APPOINTMENT (OUTPATIENT)
Dept: CT IMAGING | Age: 40
End: 2025-05-23
Payer: COMMERCIAL

## 2025-05-23 ENCOUNTER — HOSPITAL ENCOUNTER (EMERGENCY)
Age: 40
Discharge: HOME | End: 2025-05-23
Payer: COMMERCIAL

## 2025-05-23 VITALS
OXYGEN SATURATION: 97 % | RESPIRATION RATE: 18 BRPM | BODY MASS INDEX: 40.47 KG/M2 | TEMPERATURE: 98.2 F | HEIGHT: 63 IN | WEIGHT: 228.4 LBS | HEART RATE: 93 BPM | SYSTOLIC BLOOD PRESSURE: 119 MMHG | DIASTOLIC BLOOD PRESSURE: 86 MMHG

## 2025-05-23 DIAGNOSIS — K64.4 EXTERNAL HEMORRHOID: ICD-10-CM

## 2025-05-23 DIAGNOSIS — K62.5 RECTAL BLEEDING: Primary | ICD-10-CM

## 2025-05-23 LAB
ABO + RH BLD: NORMAL
ALBUMIN SERPL-MCNC: 4.1 G/DL (ref 3.4–5)
ALBUMIN/GLOB SERPL: 1.6 {RATIO} (ref 1.1–2.2)
ALP SERPL-CCNC: 91 U/L (ref 40–129)
ALT SERPL-CCNC: 18 U/L (ref 10–40)
ANION GAP SERPL CALCULATED.3IONS-SCNC: 11 MMOL/L (ref 3–16)
ANTIBODY SCREEN: NORMAL
AST SERPL-CCNC: 27 U/L (ref 15–37)
BASOPHILS # BLD: 0.1 K/UL (ref 0–0.2)
BASOPHILS NFR BLD: 0.7 %
BILIRUB SERPL-MCNC: 0.3 MG/DL (ref 0–1)
BUN SERPL-MCNC: 15 MG/DL (ref 7–20)
CALCIUM SERPL-MCNC: 9.7 MG/DL (ref 8.3–10.6)
CHLORIDE SERPL-SCNC: 104 MMOL/L (ref 99–110)
CO2 SERPL-SCNC: 23 MMOL/L (ref 21–32)
CREAT SERPL-MCNC: 0.9 MG/DL (ref 0.6–1.1)
DEPRECATED RDW RBC AUTO: 14 % (ref 12.4–15.4)
EOSINOPHIL # BLD: 0.3 K/UL (ref 0–0.6)
EOSINOPHIL NFR BLD: 3 %
GFR SERPLBLD CREATININE-BSD FMLA CKD-EPI: 82 ML/MIN/{1.73_M2}
GLUCOSE SERPL-MCNC: 67 MG/DL (ref 70–99)
HCT VFR BLD AUTO: 44.4 % (ref 36–48)
HEMOCCULT STL QL: ABNORMAL
HGB BLD-MCNC: 14.6 G/DL (ref 12–16)
INR PPP: 0.95 (ref 0.85–1.15)
LYMPHOCYTES # BLD: 2.2 K/UL (ref 1–5.1)
LYMPHOCYTES NFR BLD: 26 %
MCH RBC QN AUTO: 30 PG (ref 26–34)
MCHC RBC AUTO-ENTMCNC: 32.9 G/DL (ref 31–36)
MCV RBC AUTO: 91.2 FL (ref 80–100)
MONOCYTES # BLD: 0.8 K/UL (ref 0–1.3)
MONOCYTES NFR BLD: 9 %
NEUTROPHILS # BLD: 5.3 K/UL (ref 1.7–7.7)
NEUTROPHILS NFR BLD: 61.3 %
PLATELET # BLD AUTO: 316 K/UL (ref 135–450)
PMV BLD AUTO: 8.2 FL (ref 5–10.5)
POTASSIUM SERPL-SCNC: 4.6 MMOL/L (ref 3.5–5.1)
PROT SERPL-MCNC: 6.6 G/DL (ref 6.4–8.2)
PROTHROMBIN TIME: 12.9 SEC (ref 11.9–14.9)
RBC # BLD AUTO: 4.87 M/UL (ref 4–5.2)
SODIUM SERPL-SCNC: 138 MMOL/L (ref 136–145)
WBC # BLD AUTO: 8.6 K/UL (ref 4–11)

## 2025-05-23 PROCEDURE — 99285 EMERGENCY DEPT VISIT HI MDM: CPT

## 2025-05-23 PROCEDURE — 86850 RBC ANTIBODY SCREEN: CPT

## 2025-05-23 PROCEDURE — 86900 BLOOD TYPING SEROLOGIC ABO: CPT

## 2025-05-23 PROCEDURE — 86901 BLOOD TYPING SEROLOGIC RH(D): CPT

## 2025-05-23 PROCEDURE — 85610 PROTHROMBIN TIME: CPT

## 2025-05-23 PROCEDURE — 6360000004 HC RX CONTRAST MEDICATION: Performed by: PHYSICIAN ASSISTANT

## 2025-05-23 PROCEDURE — 80053 COMPREHEN METABOLIC PANEL: CPT

## 2025-05-23 PROCEDURE — 85025 COMPLETE CBC W/AUTO DIFF WBC: CPT

## 2025-05-23 PROCEDURE — 74177 CT ABD & PELVIS W/CONTRAST: CPT

## 2025-05-23 PROCEDURE — 82270 OCCULT BLOOD FECES: CPT

## 2025-05-23 RX ORDER — IOPAMIDOL 755 MG/ML
75 INJECTION, SOLUTION INTRAVASCULAR
Status: COMPLETED | OUTPATIENT
Start: 2025-05-23 | End: 2025-05-23

## 2025-05-23 RX ORDER — PRAMOXINE HYDROCHLORIDE HYDROCORTISONE ACETATE 100; 100 MG/10G; MG/10G
AEROSOL, FOAM TOPICAL
Qty: 10 G | Refills: 1 | Status: SHIPPED | OUTPATIENT
Start: 2025-05-23

## 2025-05-23 RX ADMIN — IOPAMIDOL 75 ML: 755 INJECTION, SOLUTION INTRAVENOUS at 20:13

## 2025-05-23 ASSESSMENT — PAIN SCALES - GENERAL: PAINLEVEL_OUTOF10: 7

## 2025-05-23 ASSESSMENT — PAIN DESCRIPTION - LOCATION: LOCATION: RECTUM

## 2025-05-23 ASSESSMENT — PAIN - FUNCTIONAL ASSESSMENT: PAIN_FUNCTIONAL_ASSESSMENT: 0-10

## 2025-05-23 NOTE — ED TRIAGE NOTES
Pt has had bleeding from rectum since Monday. Pt has had a hemorrhoid for the past year.  Pt says she has started having bright red bleeding with clots. States that the blood is present in her bowel movements. Denies being on blood thinners. Pt is anemic.

## 2025-05-24 ASSESSMENT — ENCOUNTER SYMPTOMS
SHORTNESS OF BREATH: 0
NAUSEA: 0
VOMITING: 0
EYE PAIN: 0
ABDOMINAL PAIN: 0
BACK PAIN: 0
SORE THROAT: 0
BLOOD IN STOOL: 1
COUGH: 0

## 2025-05-24 NOTE — DISCHARGE INSTRUCTIONS
Follow-up with gastroenterologist Dr. Vasquez  Use prescribed medication as prescribed only for hemorrhoid  Return emergency room for any worsening
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
